# Patient Record
Sex: FEMALE | Race: BLACK OR AFRICAN AMERICAN | Employment: UNEMPLOYED | URBAN - METROPOLITAN AREA
[De-identification: names, ages, dates, MRNs, and addresses within clinical notes are randomized per-mention and may not be internally consistent; named-entity substitution may affect disease eponyms.]

---

## 2018-07-03 ENCOUNTER — OFFICE VISIT (OUTPATIENT)
Dept: ORTHOPEDIC SURGERY | Facility: CLINIC | Age: 53
End: 2018-07-03

## 2018-07-03 VITALS
DIASTOLIC BLOOD PRESSURE: 107 MMHG | OXYGEN SATURATION: 99 % | HEART RATE: 82 BPM | BODY MASS INDEX: 26.89 KG/M2 | WEIGHT: 177.4 LBS | HEIGHT: 68 IN | RESPIRATION RATE: 16 BRPM | SYSTOLIC BLOOD PRESSURE: 176 MMHG | TEMPERATURE: 96.6 F

## 2018-07-03 DIAGNOSIS — S63.502A SPRAIN OF LEFT WRIST, INITIAL ENCOUNTER: Primary | ICD-10-CM

## 2018-07-03 DIAGNOSIS — M25.532 WRIST PAIN, LEFT: ICD-10-CM

## 2018-07-03 DIAGNOSIS — S63.642A SPRAIN OF METACARPOPHALANGEAL (MCP) JOINT OF LEFT THUMB, INITIAL ENCOUNTER: ICD-10-CM

## 2018-07-03 NOTE — PROGRESS NOTES
Patient: Gilford Cava                MRN: [id number]       SSN: [id number]  YOB: 1965        AGE: 48 y.o. SEX: female  Body mass index is 26.97 kg/(m^2). PCP: Jairo Soliman MD  07/03/18    Chief Complaint: Left wrist injury    HISTORY OF PRESENT ILLNESS:  Mariah Calvo is a 51-year-old, right-hand dominant female who comes in to the office today for a left wrist injury. While working at [employer] on June 30, 2018, she unfortunately had a fall where she fell onto her left arm. At the time, she felt most of the injury was to her left elbow in the olecranon area. She had some abrasions there that she cleaned up. She continued to do her activity on Saturday at the [employer] but noticed Saturday night that she was having increasing pain around her left wrist.  It got to the point where she was seen at an urgent care. X-rays were taken. She was placed in a splint and instructed for orthopedic follow up. Today, she complains of swelling, as well as pain globally about the wrist.  She complains of pain with pronosupination. She denies any numbness or tingling. She also reports some bruising about the wrist.         Past Medical History:   Diagnosis Date    Anasarca     With ascites. Hospitalized 11/10. Resolved with diuretics. 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 Usually controlled with Dilantin.  Small bowel obstruction ([address] 07/2010       No family history on file. Current Outpatient Prescriptions   Medication Sig Dispense Refill    lactulose (CHRONULAC) 10 gram/15 mL solution TAKE 30 ML BY MOUTH THREE (3) TIMES DAILY. 900 mL 4    rifaximin (XIFAXAN) 550 mg tablet Take 1 Tab by mouth two (2) times a day.  180 Tab 4    spironolactone (ALDACTONE) 100 mg tablet Take 1 Tab by mouth three (3) times daily. 90 Tab 11    MULTIVITAMIN/IRON/FOLIC ACID (CENTRUM ULTRA WOMEN'S PO) Take  by mouth.  METOPROLOL TARTRATE (LOPRESSOR PO) Take  by mouth.  FENTANYL TD by TransDERmal route.  CLONIDINE TD by TransDERmal route.  OXYCODONE HCL/ACETAMINOPHEN (PERCOCET PO) Take  by mouth.  CYANOCOBALAMIN, VITAMIN B-12, (VITAMIN B-12 PO) Take  by mouth.  CALCIUM CARBONATE/VITAMIN D3 (CALCIUM + D PO) Take  by mouth.  PHENYTOIN SODIUM EXTENDED (DILANTIN PO) Take  by mouth.  furosemide (LASIX) 40 mg tablet Take 1 Tab by mouth three (3) times daily. 90 Tab 11       Allergies   Allergen Reactions    Latex Hives    Codeine Hives    Keflex [Cephalexin] Hives       Past Surgical History:   Procedure Laterality Date    HX GASTRIC BYPASS  2002    HX HIP REPLACEMENT  1991    Right hip replacement       Social History     Social History    Marital status: UNKNOWN     Spouse name: N/A    Number of children: N/A    Years of education: N/A     Occupational History    Not on file. Social History Main Topics    Smoking status: Former Smoker    Smokeless tobacco: Never Used    Alcohol use No    Drug use: Not on file    Sexual activity: Not on file     Other Topics Concern    Not on file     Social History Narrative       REVIEW OF SYSTEMS:      CON: negative for recent weight loss/gain, fever, or chills  EYE: negative for double or blurry vision  ENT: negative for hoarseness  RS:   negative for cough, URI, SOB  CV:  negative for chest pain, palpitations  GI:    negative for blood in stool, nausea/vomiting  :  negative for blood in urine  MS: As per HPI  Other systems reviewed and noted below. PHYSICAL EXAMINATION:  Visit Vitals    BP (!) 176/107    Pulse 82    Temp 96.6 °F (35.9 °C) (Oral)    Resp 16    Ht 5' 8\" (1.727 m)    Wt 177 lb 6.4 oz (80.5 kg)    SpO2 99%    BMI 26.97 kg/m2     Body mass index is 26.97 kg/(m^2).     GENERAL: Alert and oriented x3, in no acute distress, well-developed, well-nourished. HEENT: Normocephalic, atraumatic. RESP: Non labored breathing with equal chest rise on inspiration. CV: Well perfused extremities. No cyanosis or clubbing noted. ABDOMEN: Soft, non-tender, non-distended. PHYSICAL EXAMINATION:  Physical exam of the left wrist and thumb reveals some mild swelling. This is globally about the wrist and hand. She is tender to palpation along the radial side of the distal radius. She is nontender over the ulnar shaft, as well as the ulnar head and styloid. There is mild tenderness to palpation along the extensor pollicis longus tendon from the forearm into the wrist and thumb. There is mild pain with thumb range of motion. She has decreased wrist range of motion due to pain. Her sensation is intact. She has pain with pronosupination. IMAGING:  X-rays from June 30, 2018, taken at [facility], as well as x-rays from today, were reviewed in the office. They do not show any definitive fractures. There is a small, cortical irregularity on the radial side of the distal radius but no extension of this, which may be old. ASSESSMENT/PLAN:  Garima Galloway is a 61-year-old female with a left wrist and thumb sprain. I do not see any definitive evidence of fracture on x-rays today or at the time of her injury. I have elected to treat this like a wrist sprain, as well as a thumb sprain and place her into a thumb spica splint. I would like to see her back in one week. She is currently taking Percocet for pain management, so I recommended an anti-inflammatory in addition to that but no further pain medicine. All her questions were answered.          Electronically signed by: Teddy [place] MD

## 2018-07-13 ENCOUNTER — OFFICE VISIT (OUTPATIENT)
Dept: ORTHOPEDIC SURGERY | Facility: CLINIC | Age: 53
End: 2018-07-13

## 2018-07-13 VITALS
TEMPERATURE: 98.2 F | SYSTOLIC BLOOD PRESSURE: 145 MMHG | DIASTOLIC BLOOD PRESSURE: 82 MMHG | WEIGHT: 177 LBS | HEART RATE: 70 BPM | BODY MASS INDEX: 26.83 KG/M2 | OXYGEN SATURATION: 99 % | HEIGHT: 68 IN

## 2018-07-13 DIAGNOSIS — M25.532 WRIST PAIN, LEFT: Primary | ICD-10-CM

## 2018-07-13 NOTE — PROGRESS NOTES
Patient: Kem Amato                MRN: [id number]       SSN: [id number] YOB: 1965        AGE: 48 y.o. SEX: female Body mass index is 26.91 kg/(m^2). PCP: Liset Berman MD 
07/13/18 HISTORY OF PRESENT ILLNESS:  Benjamin Ralph returns the office today for follow-up of her left wrist and thumb. Overall she is doing better. Her pain is much improved. She has been wearing her  [employer] splint. Overall she is happy with the progress she has made in the last week. Past Medical History:  
Diagnosis Date  Anasarca With ascites. Hospitalized 11/10. Resolved with diuretics.  Cirrhosis of liver ([address] Secondary to NAFLD  Headache, migraine  Hepatic encephalopathy ([address] Controlled with lactulose and Xifaxan  Hypertension  Seizures ([address] Secondary to migraine headaches. Usually controlled with Dilantin.  Small bowel obstruction ([address] 07/2010 Family History Problem Relation Age of Onset  No Known Problems Mother  Heart Disease Father  Heart Attack Father Current Outpatient Prescriptions Medication Sig Dispense Refill  lactulose (CHRONULAC) 10 gram/15 mL solution TAKE 30 ML BY MOUTH THREE (3) TIMES DAILY. 900 mL 4  
 rifaximin (XIFAXAN) 550 mg tablet Take 1 Tab by mouth two (2) times a day. 180 Tab 4  
 spironolactone (ALDACTONE) 100 mg tablet Take 1 Tab by mouth three (3) times daily. 90 Tab 11  
 furosemide (LASIX) 40 mg tablet Take 1 Tab by mouth three (3) times daily. 90 Tab 11  
 MULTIVITAMIN/IRON/FOLIC ACID (CENTRUM ULTRA WOMEN'S PO) Take  by mouth.  METOPROLOL TARTRATE (LOPRESSOR PO) Take  by mouth.  FENTANYL TD by TransDERmal route.  CLONIDINE TD by TransDERmal route.  OXYCODONE HCL/ACETAMINOPHEN (PERCOCET PO) Take  by mouth.  CYANOCOBALAMIN, VITAMIN B-12, (VITAMIN B-12 PO) Take  by mouth.  CALCIUM CARBONATE/VITAMIN D3 (CALCIUM + D PO) Take  by mouth.     
 PHENYTOIN SODIUM EXTENDED (DILANTIN PO) Take  by mouth. Allergies Allergen Reactions  Latex Hives  Codeine Hives  Keflex [Cephalexin] Hives Past Surgical History:  
Procedure Laterality Date  HX GASTRIC BYPASS  2002 [address] Right hip replacement  HX KNEE REPLACEMENT Social History Social History  Marital status:  Spouse name: N/A  
 Number of children: N/A  
 Years of education: N/A Occupational History  Not on file. Social History Main Topics  Smoking status: Former Smoker  Smokeless tobacco: Never Used  Alcohol use No  
 Drug use: Not on file  Sexual activity: Not on file Other Topics Concern  Not on file Social History Narrative REVIEW OF SYSTEMS:   
 
No changes from previous review of systems unless noted. PHYSICAL EXAMINATION: 
Visit Vitals  /82 (BP 1 Location: Left arm, BP Patient Position: Sitting)  Pulse 70  Temp 98.2 °F (36.8 °C) (Oral)  Ht 5' 8\" (1.727 m)  Wt 177 lb (80.3 kg)  SpO2 99%  BMI 26.91 kg/m2 Body mass index is 26.91 kg/(m^2). GENERAL: Alert and oriented x3, in no acute distress. HEENT: Normocephalic, atraumatic. RESP: Non labored breathing. SKIN: No rashes or lesions noted. PHYSICAL EXAMINATION:  Physical exam of the left wrist and thumb after the arm was taken out of the splint reveals decreased tenderness to palpation as well as decreased swelling globally throughout the hand and wrist.  She has improved range of motion at the elbow, wrist, thumb, and fingers. She also has a mild tenderness to palpation over the dorsal side of her wrist as well is over the basement thumb. Overall she is improved, however, from her last visit. Her sensation is intact to light touch globally about the hand, wrist, forearm, and elbow. ASSESSMENT AND PLAN:  Nabil Nielson is a 62-year-old female with a left wrist sprain as well as left thumb sprain.    I have recommended continuing with conservative treatment wearing a brace as needed   She can start to wean from the brace as tolerated. I would like to see her back in 3 weeks with the plan at that time to wean her from the brace full time if she has not done it already.  
 
 
 
Electronically signed by: Christina Thomas MD

## 2020-09-24 ENCOUNTER — VIRTUAL VISIT (OUTPATIENT)
Dept: FAMILY MEDICINE CLINIC | Age: 55
End: 2020-09-24
Payer: COMMERCIAL

## 2020-09-24 DIAGNOSIS — R11.0 NAUSEA: ICD-10-CM

## 2020-09-24 DIAGNOSIS — R56.9 SEIZURE (HCC): Primary | ICD-10-CM

## 2020-09-24 DIAGNOSIS — I10 ESSENTIAL HYPERTENSION: ICD-10-CM

## 2020-09-24 PROCEDURE — 1220F PT SCREENED FOR DEPRESSION: CPT | Performed by: FAMILY MEDICINE

## 2020-09-24 PROCEDURE — 99202 OFFICE O/P NEW SF 15 MIN: CPT | Performed by: FAMILY MEDICINE

## 2020-09-24 RX ORDER — CLONIDINE HYDROCHLORIDE 0.2 MG/1
0.2 TABLET ORAL 2 TIMES DAILY
Qty: 60 TAB | Refills: 2 | Status: SHIPPED | OUTPATIENT
Start: 2020-09-24 | End: 2021-05-06 | Stop reason: SDUPTHER

## 2020-09-24 RX ORDER — BACLOFEN 10 MG/1
TABLET ORAL
COMMUNITY
Start: 2020-09-05 | End: 2021-05-18 | Stop reason: SDUPTHER

## 2020-09-24 RX ORDER — ONDANSETRON 8 MG/1
8 TABLET, ORALLY DISINTEGRATING ORAL
Qty: 12 TAB | Refills: 2 | Status: SHIPPED | OUTPATIENT
Start: 2020-09-24 | End: 2021-05-06 | Stop reason: SDUPTHER

## 2020-09-24 RX ORDER — LEVETIRACETAM 500 MG/1
500 TABLET ORAL 2 TIMES DAILY
COMMUNITY

## 2020-09-24 RX ORDER — ONDANSETRON 4 MG/1
TABLET, ORALLY DISINTEGRATING ORAL
COMMUNITY
Start: 2018-02-19 | End: 2020-09-24 | Stop reason: ALTCHOICE

## 2020-09-24 RX ORDER — ZOLPIDEM TARTRATE 10 MG/1
10 TABLET ORAL
COMMUNITY
End: 2020-09-24 | Stop reason: ALTCHOICE

## 2020-09-24 RX ORDER — ONDANSETRON 4 MG/1
4 TABLET, FILM COATED ORAL
COMMUNITY
End: 2020-09-24 | Stop reason: ALTCHOICE

## 2020-09-24 RX ORDER — ZOLPIDEM TARTRATE 10 MG/1
10 TABLET ORAL
Status: CANCELLED | OUTPATIENT
Start: 2020-09-24

## 2020-09-24 RX ORDER — OMEPRAZOLE 20 MG/1
CAPSULE, DELAYED RELEASE ORAL
COMMUNITY
Start: 2020-09-06 | End: 2021-05-06 | Stop reason: SDUPTHER

## 2020-09-24 RX ORDER — BUTALBITAL, ACETAMINOPHEN AND CAFFEINE 50; 325; 40 MG/1; MG/1; MG/1
TABLET ORAL
Qty: 24 TAB | Refills: 1 | Status: SHIPPED | OUTPATIENT
Start: 2020-09-24 | End: 2020-10-21 | Stop reason: SDUPTHER

## 2020-09-24 RX ORDER — BUTALBITAL, ACETAMINOPHEN AND CAFFEINE 50; 325; 40 MG/1; MG/1; MG/1
TABLET ORAL
COMMUNITY
Start: 2017-11-17 | End: 2020-09-24 | Stop reason: SDUPTHER

## 2020-09-24 RX ORDER — CLONIDINE HYDROCHLORIDE 0.2 MG/1
0.2 TABLET ORAL 2 TIMES DAILY
COMMUNITY
End: 2020-09-24 | Stop reason: SDUPTHER

## 2020-09-24 RX ORDER — POTASSIUM CHLORIDE 20 MEQ/1
TABLET, EXTENDED RELEASE ORAL
COMMUNITY
Start: 2017-09-06 | End: 2021-05-06 | Stop reason: SDUPTHER

## 2020-09-24 RX ORDER — GABAPENTIN 300 MG/1
300 CAPSULE ORAL 3 TIMES DAILY
COMMUNITY

## 2020-09-24 NOTE — PROGRESS NOTES
Joe Rooney is a 50 y.o. female who was seen by synchronous (real-time) audio-video technology on 9/24/2020 for Hypertension; Headache; Seizure; and Establish Care        Assessment & Plan:   Diagnoses and all orders for this visit:    1. 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 Controlled with Dilantin  -     butalbital-acetaminophen-caffeine (FIORICET, ESGIC) -40 mg per tablet; Take 1 Tab by Mouth Every 4 Hours As Needed for Other (HEADACHE). Not to exceed 6 TABLETS per day. No driving on this medication;  -     MS PATIENT SCREENED FOR DEPRESSION  -     REFERRAL TO NEUROLOGY  -     PHENYTOIN; Future    2. Nausea  -     ondansetron (ZOFRAN ODT) 8 mg disintegrating tablet; Take 1 Tab by mouth every eight (8) hours as needed for Nausea or Vomiting. 3. Essential hypertension  -     cloNIDine HCL (CATAPRES) 0.2 mg tablet; Take 1 Tab by mouth two (2) times a day. -     METABOLIC PANEL, COMPREHENSIVE; Future  -     CBC WITH AUTOMATED DIFF; Future  -     LIPID PANEL; Future  -     TSH AND FREE T4; Future          712  Subjective:       Prior to Admission medications    Medication Sig Start Date End Date Taking? Authorizing Provider   levETIRAcetam (Keppra) 500 mg tablet Take 500 mg by mouth two (2) times a day. Yes Provider, Historical   gabapentin (NEURONTIN) 300 mg capsule Take 300 mg by mouth three (3) times daily. Yes Provider, Historical   baclofen (LIORESAL) 10 mg tablet  9/5/20  Yes Provider, Historical   potassium chloride (Klor-Con M20) 20 mEq tablet TAKE 1 TAB BY MOUTH ONCE A DAY. 9/6/17  Yes Provider, Historical   butalbital-acetaminophen-caffeine (FIORICET, ESGIC) -40 mg per tablet Take 1 Tab by Mouth Every 4 Hours As Needed for Other (HEADACHE). Not to exceed 6 TABLETS per day. No driving on this medication; 9/24/20  Yes Julio Guerrero MD   cloNIDine HCL (CATAPRES) 0.2 mg tablet Take 1 Tab by mouth two (2) times a day.  9/24/20  Yes Julio Guerrero MD   ondansetron (ZOFRAN ODT) 8 mg disintegrating tablet Take 1 Tab by mouth every eight (8) hours as needed for Nausea or Vomiting. 9/24/20  Yes Jimmie Rivera MD   lactulose (CHRONULAC) 10 gram/15 mL solution TAKE 30 ML BY MOUTH THREE (3) TIMES DAILY. 7/30/16  Yes Leticia Palafox MD   rifaximin (XIFAXAN) 550 mg tablet Take 1 Tab by mouth two (2) times a day. 5/24/12  Yes Mansi Gary NP   spironolactone (ALDACTONE) 100 mg tablet Take 1 Tab by mouth three (3) times daily. 12/15/11  Yes Mansi Gary NP   MULTIVITAMIN/IRON/FOLIC ACID (CENTRUM ULTRA WOMEN'S PO) Take  by mouth. Yes Provider, Historical   METOPROLOL TARTRATE (LOPRESSOR PO) Take 25 mg by mouth every eight (8) hours. Yes Provider, Historical   FENTANYL TD 12 mcg by TransDERmal route. Yes Provider, Historical   CYANOCOBALAMIN, VITAMIN B-12, (VITAMIN B-12 PO) Take  by mouth. Yes Provider, Historical   CALCIUM CARBONATE/VITAMIN D3 (CALCIUM + D PO) Take  by mouth. Yes Provider, Historical   PHENYTOIN SODIUM EXTENDED (DILANTIN PO) Take 400 mg by mouth. Yes Provider, Historical   omeprazole (PRILOSEC) 20 mg capsule  9/6/20   Provider, Historical   cloNIDine HCL (CATAPRES) 0.2 mg tablet Take 0.2 mg by mouth two (2) times a day. 9/24/20  Provider, Historical   BACLOFEN PO Take 100 mg by mouth.  9/24/20  Provider, Historical   zolpidem (Ambien) 10 mg tablet Take 10 mg by mouth nightly as needed for Sleep. 9/24/20  Provider, Historical   ondansetron hcl (Zofran) 4 mg tablet Take 4 mg by mouth every eight (8) hours as needed for Nausea or Vomiting.  9/24/20  Provider, Historical   butalbital-acetaminophen-caffeine (FIORICET, ESGIC) -40 mg per tablet Take 1 Tab by Mouth Every 4 Hours As Needed for Other (HEADACHE). Not to exceed 6 TABLETS per day.   No driving on this medication; 11/17/17 9/24/20  Provider, Historical   ondansetron (ZOFRAN ODT) 4 mg disintegrating tablet DISSOLVE 1 TAB ON TONGUE EVERY 8 HOURS FOR NAUSEA AND VOMITING 2/19/18 9/24/20  Provider, Historical furosemide (LASIX) 40 mg tablet Take 1 Tab by mouth three (3) times daily. 12/15/11   Real Fleet, NP   CLONIDINE TD by TransDERmal route. 9/24/20  Provider, Historical   OXYCODONE HCL/ACETAMINOPHEN (PERCOCET PO) Take 5 mg by mouth.  9/24/20  Provider, Historical     Patient Active Problem List   Diagnosis Code    MINOR (nonalcoholic steatohepatitis) K75.81    Cir[facility] K74.60    S/P gastric bypass 2002 Z98.84    Hypertension I10    Right Hip replacement 1991     Small bowel obstruction 7/2010 K56.609    Hepatic encephalopathy (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 Controlled with Dilantin R56.9     Past Medical History:   Diagnosis Date    Anasarca     With ascites. Hospitalized 11/10. Resolved with diuretics.  Cirrhosis of liver ([facility]     Secondary to NAFLD    Headache, migraine     Hepatic encephalopathy ([address]     Controlled with lactulose and Xifaxan    Hypertension     Seizures ([address]     Secondary to migraine headaches. Usually controlled with Dilantin.  Small bowel obstruction ([address] 07/2010       Review of Systems   Constitutional: Negative for chills and fever. Respiratory: Negative for cough. Cardiovascular: Negative for chest pain. Neurological: Negative. Psychiatric/Behavioral: Negative. Objective:   No flowsheet data found. General: alert, cooperative, no distress   Mental  status: normal mood, behavior, speech, dress, motor activity, and thought processes, able to follow commands   HENT: NCAT   Neck: no visualized mass   Resp: no respiratory distress   Neuro: no gross deficits   Skin: no discoloration or lesions of concern on visible areas   Psychiatric: normal affect, consistent with stated mood, no evidence of hallucinations     Additional exam findings: We discussed the expected course, resolution and complications of the diagnosis(es) in detail.   Medication risks, benefits, costs, interactions, and alternatives were discussed as indicated. I advised her to contact the office if her condition worsens, changes or fails to improve as anticipated. She expressed understanding with the diagnosis(es) and plan. Juan Luis Batista, who was evaluated through a patient-initiated, synchronous (real-time) audio-video encounter, and/or her healthcare decision maker, is aware that it is a billable service, with coverage as determined by her insurance carrier. She provided verbal consent to proceed: Yes, and patient identification was verified. It was conducted pursuant to the emergency declaration under the [address] [place] [address], [address] [place] [address] authority and the [employer] Act. A caregiver was present when appropriate. Ability to conduct physical exam was limited. I was at home. The patient was at home.       Tommie Engle MD

## 2020-09-24 NOTE — PROGRESS NOTES
Pt VV NP for headaches and med refills. She needs referral to neuro. Would like to see if she could get Zofran 8 mg disintegrating  tab instead of the 4 mg. Pt has hx seizures, HNT, migraines. Was given Topamax high dose, but it didn't do anything for her headaches. Also tried nortriptyline, but that made her too groggy. She does see pain management. Refills have been pended. I did pend the Zofran 8 mg, please review and sign if appropriate.

## 2020-10-21 ENCOUNTER — TELEPHONE (OUTPATIENT)
Dept: FAMILY MEDICINE CLINIC | Age: 55
End: 2020-10-21

## 2020-10-21 DIAGNOSIS — R56.9 SEIZURE (HCC): ICD-10-CM

## 2020-10-21 NOTE — TELEPHONE ENCOUNTER
Patient called requesting a refill on:   Requested Prescriptions     Pending Prescriptions Disp Refills    butalbital-acetaminophen-caffeine (FIORICET, ESGIC) -40 mg per tablet 24 Tab 1     Sig: Take 1 Tab by Mouth Every 4 Hours As Needed for Other (HEADACHE). Not to exceed 6 TABLETS per day. No driving on this medication;      Last seen 9/24/20

## 2020-10-22 ENCOUNTER — TELEPHONE (OUTPATIENT)
Dept: FAMILY MEDICINE CLINIC | Age: 55
End: 2020-10-22

## 2020-10-22 RX ORDER — BUTALBITAL, ACETAMINOPHEN AND CAFFEINE 50; 325; 40 MG/1; MG/1; MG/1
TABLET ORAL
Qty: 24 TAB | Refills: 1 | Status: SHIPPED | OUTPATIENT
Start: 2020-10-22 | End: 2020-11-25 | Stop reason: SDUPTHER

## 2020-10-22 NOTE — TELEPHONE ENCOUNTER
butalbital-acetaminophen-caffeine (FIORICET, ESGIC) -40 mg per tablet [[id number]]     Order Details   Dose, Route, Frequency: As Directed    Dispense Quantity: 24 Tab  Refills: 1  Fills remaining: --             Sig:      Take 1 Tab by Mouth Every 4 Hours As Needed for Other (HEADACHE). Not to exceed 6 TABLETS per day. No driving on this medication;                     Above med called in to pharmacy. Paper copy shredded.

## 2020-11-25 DIAGNOSIS — R56.9 SEIZURE (HCC): ICD-10-CM

## 2020-11-25 RX ORDER — BUTALBITAL, ACETAMINOPHEN AND CAFFEINE 50; 325; 40 MG/1; MG/1; MG/1
TABLET ORAL
Qty: 24 TAB | Refills: 1 | Status: SHIPPED | OUTPATIENT
Start: 2020-11-25 | End: 2020-12-17

## 2020-11-25 NOTE — TELEPHONE ENCOUNTER
Deborah Goodson LPN            Previous Messages     ----- Message -----   From: Tha Calloway   Sent: 11/20/2020  11:55 AM EST   To: Elia Burks [facility] Office [place]   Subject: Med Refill                                       The patient is requesting a medication refill for butalbital-acetaminophen-caffeine (FIORICET, ESGIC) -40 mg per tablet.

## 2020-12-16 DIAGNOSIS — R56.9 SEIZURE (HCC): ICD-10-CM

## 2020-12-17 RX ORDER — BUTALBITAL, ACETAMINOPHEN AND CAFFEINE 50; 325; 40 MG/1; MG/1; MG/1
TABLET ORAL
Qty: 24 TAB | Refills: 1 | Status: SHIPPED | OUTPATIENT
Start: 2020-12-17 | End: 2021-01-11

## 2021-01-08 DIAGNOSIS — R56.9 SEIZURE (HCC): ICD-10-CM

## 2021-01-11 RX ORDER — ZOLPIDEM TARTRATE 10 MG/1
TABLET ORAL
Qty: 30 TAB | Refills: 1 | Status: SHIPPED | OUTPATIENT
Start: 2021-01-11 | End: 2021-03-02 | Stop reason: SDUPTHER

## 2021-01-11 RX ORDER — BUTALBITAL, ACETAMINOPHEN AND CAFFEINE 50; 325; 40 MG/1; MG/1; MG/1
TABLET ORAL
Qty: 24 TAB | Refills: 1 | Status: SHIPPED | OUTPATIENT
Start: 2021-01-11 | End: 2021-02-03

## 2021-02-03 DIAGNOSIS — R56.9 SEIZURE (HCC): ICD-10-CM

## 2021-02-03 RX ORDER — BUTALBITAL, ACETAMINOPHEN AND CAFFEINE 50; 325; 40 MG/1; MG/1; MG/1
TABLET ORAL
Qty: 24 TAB | Refills: 1 | Status: SHIPPED | OUTPATIENT
Start: 2021-02-03 | End: 2021-03-01

## 2021-03-01 DIAGNOSIS — R56.9 SEIZURE (HCC): ICD-10-CM

## 2021-03-01 RX ORDER — BUTALBITAL, ACETAMINOPHEN AND CAFFEINE 50; 325; 40 MG/1; MG/1; MG/1
TABLET ORAL
Qty: 24 TAB | Refills: 1 | Status: SHIPPED | OUTPATIENT
Start: 2021-03-01 | End: 2021-03-02 | Stop reason: SDUPTHER

## 2021-03-02 DIAGNOSIS — R56.9 SEIZURE (HCC): ICD-10-CM

## 2021-03-02 RX ORDER — BUTALBITAL, ACETAMINOPHEN AND CAFFEINE 50; 325; 40 MG/1; MG/1; MG/1
1 TABLET ORAL
Qty: 24 TAB | Refills: 1 | Status: SHIPPED | OUTPATIENT
Start: 2021-03-02 | End: 2021-03-26

## 2021-03-02 RX ORDER — ZOLPIDEM TARTRATE 10 MG/1
TABLET ORAL
Qty: 30 TAB | Refills: 1 | Status: SHIPPED | OUTPATIENT
Start: 2021-03-02 | End: 2021-05-06 | Stop reason: SDUPTHER

## 2021-03-02 NOTE — TELEPHONE ENCOUNTER
This patient contacted office for the following prescriptions to be filled:    Medication requested :   Requested Prescriptions     Pending Prescriptions Disp Refills    butalbital-acetaminophen-caffeine (FIORICET, ESGIC) -40 mg per tablet 24 Tab 1    zolpidem (AMBIEN) 10 mg tablet 30 Tab 1     Sig: TAKE 1 TAB BY MOUTH NIGHTLY AS NEEDED (SLEEP). PCP: Dr. Lars Martinez or Print: Pharmacy  Mail order or Local pharmacy: Local     Scheduled appointment if not seen by current providers in office: Last appt 09/24/20  Please advise on scheduling.

## 2021-03-03 ENCOUNTER — TELEPHONE (OUTPATIENT)
Dept: FAMILY MEDICINE CLINIC | Age: 56
End: 2021-03-03

## 2021-03-03 NOTE — TELEPHONE ENCOUNTER
Patient called the office stating her Fioricet was not received at her pharmacy. Told this may have printed out and I will send a message to Dr. Kelly Mccracken nurse to call her to discuss. Patient says she drives from [facility] to  her medications and would like to be able to pick them both up today at the same time, please call patient.

## 2021-03-03 NOTE — TELEPHONE ENCOUNTER
butalbital-acetaminophen-caffeine (FIORICET, ESGIC) -40 mg per tablet   Take 1 Tab by mouth every four (4) hours as needed for Headache.      Above medication called in on the pharmacy prescription line at 10:06 am.

## 2021-03-25 DIAGNOSIS — R56.9 SEIZURE (HCC): ICD-10-CM

## 2021-03-26 RX ORDER — BUTALBITAL, ACETAMINOPHEN AND CAFFEINE 50; 325; 40 MG/1; MG/1; MG/1
TABLET ORAL
Qty: 24 TAB | Refills: 1 | Status: SHIPPED | OUTPATIENT
Start: 2021-03-26 | End: 2021-04-08

## 2021-04-08 DIAGNOSIS — R56.9 SEIZURE (HCC): ICD-10-CM

## 2021-04-08 RX ORDER — BUTALBITAL, ACETAMINOPHEN AND CAFFEINE 50; 325; 40 MG/1; MG/1; MG/1
TABLET ORAL
Qty: 24 TAB | Refills: 1 | Status: SHIPPED | OUTPATIENT
Start: 2021-04-08 | End: 2021-05-06 | Stop reason: SDUPTHER

## 2021-05-04 DIAGNOSIS — R56.9 SEIZURE (HCC): ICD-10-CM

## 2021-05-04 RX ORDER — ZOLPIDEM TARTRATE 10 MG/1
TABLET ORAL
Qty: 30 TAB | Refills: 1 | OUTPATIENT
Start: 2021-05-04

## 2021-05-04 RX ORDER — BUTALBITAL, ACETAMINOPHEN AND CAFFEINE 50; 325; 40 MG/1; MG/1; MG/1
TABLET ORAL
Qty: 24 TAB | Refills: 1 | OUTPATIENT
Start: 2021-05-04

## 2021-05-06 ENCOUNTER — PATIENT MESSAGE (OUTPATIENT)
Dept: FAMILY MEDICINE CLINIC | Age: 56
End: 2021-05-06

## 2021-05-06 ENCOUNTER — OFFICE VISIT (OUTPATIENT)
Dept: FAMILY MEDICINE CLINIC | Age: 56
End: 2021-05-06
Payer: COMMERCIAL

## 2021-05-06 ENCOUNTER — HOSPITAL ENCOUNTER (OUTPATIENT)
Dept: LAB | Age: 56
Discharge: HOME | End: 2021-05-06
Payer: COMMERCIAL

## 2021-05-06 VITALS
SYSTOLIC BLOOD PRESSURE: 124 MMHG | WEIGHT: 194.6 LBS | BODY MASS INDEX: 29.59 KG/M2 | DIASTOLIC BLOOD PRESSURE: 70 MMHG | TEMPERATURE: 98.1 F | RESPIRATION RATE: 14 BRPM | OXYGEN SATURATION: 96 % | HEART RATE: 88 BPM

## 2021-05-06 DIAGNOSIS — R56.9 SEIZURE (HCC): ICD-10-CM

## 2021-05-06 DIAGNOSIS — I10 ESSENTIAL HYPERTENSION: ICD-10-CM

## 2021-05-06 DIAGNOSIS — R11.0 NAUSEA: ICD-10-CM

## 2021-05-06 DIAGNOSIS — Z12.31 ENCOUNTER FOR SCREENING MAMMOGRAM FOR MALIGNANT NEOPLASM OF BREAST: ICD-10-CM

## 2021-05-06 DIAGNOSIS — I10 ESSENTIAL HYPERTENSION: Primary | ICD-10-CM

## 2021-05-06 DIAGNOSIS — K74.60 CIRRHOSIS (HCC): ICD-10-CM

## 2021-05-06 DIAGNOSIS — K59.01 SLOW TRANSIT CONSTIPATION: ICD-10-CM

## 2021-05-06 LAB
ALBUMIN SERPL-MCNC: 2.7 G/DL (ref 3.4–5)
ALBUMIN/GLOB SERPL: 0.6 {RATIO} (ref 0.8–1.7)
ALP SERPL-CCNC: 193 U/L (ref 45–117)
ALT SERPL-CCNC: 24 U/L (ref 13–56)
ANION GAP SERPL CALC-SCNC: 5 MMOL/L (ref 3–18)
AST SERPL-CCNC: 14 U/L (ref 10–38)
BASOPHILS # BLD: 0 K/UL (ref 0–0.1)
BASOPHILS NFR BLD: 0 % (ref 0–2)
BILIRUB SERPL-MCNC: 0.1 MG/DL (ref 0.2–1)
BUN SERPL-MCNC: 9 MG/DL (ref 7–18)
BUN/CREAT SERPL: 14 (ref 12–20)
CALCIUM SERPL-MCNC: 8.3 MG/DL (ref 8.5–10.1)
CHLORIDE SERPL-SCNC: 108 MMOL/L (ref 100–111)
CHOLEST SERPL-MCNC: 149 MG/DL
CO2 SERPL-SCNC: 27 MMOL/L (ref 21–32)
CREAT SERPL-MCNC: 0.65 MG/DL (ref 0.6–1.3)
DIFFERENTIAL METHOD BLD: ABNORMAL
EOSINOPHIL # BLD: 0.2 K/UL (ref 0–0.4)
EOSINOPHIL NFR BLD: 2 % (ref 0–5)
ERYTHROCYTE [DISTWIDTH] IN BLOOD BY AUTOMATED COUNT: 13.2 % (ref 11.6–14.5)
GLOBULIN SER CALC-MCNC: 4.7 G/DL (ref 2–4)
GLUCOSE SERPL-MCNC: 55 MG/DL (ref 74–99)
HCT VFR BLD AUTO: 35.3 % (ref 35–45)
HDLC SERPL-MCNC: 40 MG/DL (ref 40–60)
HDLC SERPL: 3.7 {RATIO} (ref 0–5)
HGB BLD-MCNC: 11.4 G/DL (ref 12–16)
LDLC SERPL CALC-MCNC: 89.6 MG/DL (ref 0–100)
LIPID PROFILE,FLP: NORMAL
LYMPHOCYTES # BLD: 1.3 K/UL (ref 0.9–3.6)
LYMPHOCYTES NFR BLD: 16 % (ref 21–52)
MCH RBC QN AUTO: 31.1 PG (ref 24–34)
MCHC RBC AUTO-ENTMCNC: 32.3 G/DL (ref 31–37)
MCV RBC AUTO: 96.4 FL (ref 74–97)
MONOCYTES # BLD: 0.7 K/UL (ref 0.05–1.2)
MONOCYTES NFR BLD: 8 % (ref 3–10)
NEUTS SEG # BLD: 5.9 K/UL (ref 1.8–8)
NEUTS SEG NFR BLD: 73 % (ref 40–73)
PLATELET # BLD AUTO: 701 K/UL (ref 135–420)
PMV BLD AUTO: 9.3 FL (ref 9.2–11.8)
POTASSIUM SERPL-SCNC: 3.9 MMOL/L (ref 3.5–5.5)
PROT SERPL-MCNC: 7.4 G/DL (ref 6.4–8.2)
RBC # BLD AUTO: 3.66 M/UL (ref 4.2–5.3)
SODIUM SERPL-SCNC: 140 MMOL/L (ref 136–145)
T4 FREE SERPL-MCNC: 1.2 NG/DL (ref 0.7–1.5)
TRIGL SERPL-MCNC: 97 MG/DL (ref ?–150)
TSH SERPL DL<=0.05 MIU/L-ACNC: 2.44 UIU/ML (ref 0.36–3.74)
VLDLC SERPL CALC-MCNC: 19.4 MG/DL
WBC # BLD AUTO: 8.2 K/UL (ref 4.6–13.2)

## 2021-05-06 PROCEDURE — 84439 ASSAY OF FREE THYROXINE: CPT

## 2021-05-06 PROCEDURE — 85025 COMPLETE CBC W/AUTO DIFF WBC: CPT

## 2021-05-06 PROCEDURE — 80061 LIPID PANEL: CPT

## 2021-05-06 PROCEDURE — 99214 OFFICE O/P EST MOD 30 MIN: CPT | Performed by: FAMILY MEDICINE

## 2021-05-06 PROCEDURE — 80053 COMPREHEN METABOLIC PANEL: CPT

## 2021-05-06 RX ORDER — LACTULOSE 10 G/15ML
SOLUTION ORAL; RECTAL
Qty: 900 ML | Refills: 1 | Status: SHIPPED | OUTPATIENT
Start: 2021-05-06

## 2021-05-06 RX ORDER — FUROSEMIDE 40 MG/1
40 TABLET ORAL 3 TIMES DAILY
Qty: 90 TAB | Refills: 1 | Status: SHIPPED | OUTPATIENT
Start: 2021-05-06 | End: 2021-05-28

## 2021-05-06 RX ORDER — POTASSIUM CHLORIDE 20 MEQ/1
TABLET, EXTENDED RELEASE ORAL
Qty: 90 TAB | Refills: 1 | OUTPATIENT
Start: 2021-05-06 | End: 2021-10-04

## 2021-05-06 RX ORDER — ZOLPIDEM TARTRATE 10 MG/1
TABLET ORAL
Qty: 30 TAB | Refills: 1 | Status: SHIPPED | OUTPATIENT
Start: 2021-05-06 | End: 2021-06-03

## 2021-05-06 RX ORDER — BUTALBITAL, ACETAMINOPHEN AND CAFFEINE 50; 325; 40 MG/1; MG/1; MG/1
TABLET ORAL
Qty: 24 TAB | Refills: 1 | Status: SHIPPED | OUTPATIENT
Start: 2021-05-06 | End: 2021-06-03

## 2021-05-06 RX ORDER — OMEPRAZOLE 20 MG/1
20 CAPSULE, DELAYED RELEASE ORAL DAILY
Qty: 90 CAP | Refills: 1 | Status: SHIPPED | OUTPATIENT
Start: 2021-05-06 | End: 2021-11-17

## 2021-05-06 RX ORDER — SPIRONOLACTONE 100 MG/1
100 TABLET, FILM COATED ORAL 3 TIMES DAILY
Qty: 270 TAB | Refills: 1 | Status: SHIPPED | OUTPATIENT
Start: 2021-05-06 | End: 2021-11-18 | Stop reason: SDUPTHER

## 2021-05-06 RX ORDER — ONDANSETRON 8 MG/1
8 TABLET, ORALLY DISINTEGRATING ORAL
Qty: 12 TAB | Refills: 2 | Status: SHIPPED | OUTPATIENT
Start: 2021-05-06 | End: 2021-11-18 | Stop reason: SDUPTHER

## 2021-05-06 RX ORDER — CLONIDINE HYDROCHLORIDE 0.2 MG/1
0.2 TABLET ORAL 2 TIMES DAILY
Qty: 180 TAB | Refills: 1 | Status: SHIPPED | OUTPATIENT
Start: 2021-05-06 | End: 2021-11-18 | Stop reason: SDUPTHER

## 2021-05-06 NOTE — PATIENT INSTRUCTIONS
Constipation: Care Instructions Your Care Instructions Constipation means that you have a hard time passing stools (bowel movements). People pass stools from 3 times a day to once every 3 days. What is normal for you may be different. Constipation may occur with pain in the rectum and cramping. The pain may get worse when you try to pass stools. Sometimes there are small amounts of bright red blood on toilet paper or the surface of stools. This is because of enlarged veins near the rectum (hemorrhoids). A few changes in your diet and lifestyle may help you avoid ongoing constipation. Your doctor may also prescribe medicine to help loosen your stool. Some medicines can cause constipation. These include pain medicines and antidepressants. Tell your doctor about all the medicines you take. Your doctor may want to make a medicine change to ease your symptoms. Follow-up care is a key part of your treatment and safety. Be sure to make and go to all appointments, and call your doctor if you are having problems. It's also a good idea to know your test results and keep a list of the medicines you take. How can you care for yourself at home? · Drink plenty of fluids. If you have kidney, heart, or liver disease and have to limit fluids, talk with your doctor before you increase the amount of fluids you drink. · Include high-fiber foods in your diet each day. These include fruits, vegetables, beans, and whole grains. · Get at least 30 minutes of exercise on most days of the week. Walking is a good choice. You also may want to do other activities, such as running, swimming, cycling, or playing tennis or team sports. · Take a fiber supplement, such as Citrucel or Metamucil, every day. Read and follow all instructions on the label. · Schedule time each day for a bowel movement. A daily routine may help. Take your time having your bowel movement.  
· Support your feet with a small step stool when you sit on the toilet. This helps flex your hips and places your pelvis in a squatting position. · Your doctor may recommend an over-the-counter laxative to relieve your constipation. Examples are Milk of Magnesia and MiraLax. Read and follow all instructions on the label. Do not use laxatives on a long-term basis. When should you call for help? Call your doctor now or seek immediate medical care if: 
  · You have new or worse belly pain.  
  · You have new or worse nausea or vomiting.  
  · You have blood in your stools. Watch closely for changes in your health, and be sure to contact your doctor if: 
  · Your constipation is getting worse.  
  · You do not get better as expected. Where can you learn more? Go to http://www.gray.com/ Enter 21 [phone] in the search box to learn more about \"Constipation: Care Instructions. \" Current as of: February 26, 2020               Content Version: 12.8 © 1748-5030 [employer]. Care instructions adapted under license by [employer] (which disclaims liability or warranty for this information). If you have questions about a medical condition or this instruction, always ask your healthcare professional. [address] any warranty or liability for your use of this information.

## 2021-05-06 NOTE — PROGRESS NOTES
Jadiel Abrams is a 49 y.o. female  presents for follow up on chronic illness. No new sxs. She does have some nausea and GI sxs. No vomiting     Allergies   Allergen Reactions    Latex Hives    Codeine Hives    Keflex [Cephalexin] Hives     Outpatient Medications Marked as Taking for the 5/6/21 encounter (Office Visit) with Severo Long, MD   Medication Sig Dispense Refill    butalbital-acetaminophen-caffeine (FIORICET, ESGIC) -40 mg per tablet TAKE 1 TABLET BY MOUTH EVERY 4 HOURS AS NEEDED FOR HEADACHES 24 Tab 1    omeprazole (PRILOSEC) 20 mg capsule Take 1 Cap by mouth daily. 90 Cap 1    ondansetron (ZOFRAN ODT) 8 mg disintegrating tablet Take 1 Tab by mouth every eight (8) hours as needed for Nausea or Vomiting. 12 Tab 2    lactulose (CHRONULAC) 10 gram/15 mL solution TAKE 30 ML BY MOUTH THREE (3) TIMES DAILY. 900 mL 1    spironolactone (Aldactone) 100 mg tablet Take 1 Tab by mouth three (3) times daily. 270 Tab 1    furosemide (Lasix) 40 mg tablet Take 1 Tab by mouth three (3) times daily. 90 Tab 1    cloNIDine HCL (CATAPRES) 0.2 mg tablet Take 1 Tab by mouth two (2) times a day. 180 Tab 1    potassium chloride (Klor-Con M20) 20 mEq tablet TAKE 1 TAB BY MOUTH ONCE A DAY. 90 Tab 1    zolpidem (AMBIEN) 10 mg tablet TAKE 1 TAB BY MOUTH NIGHTLY AS NEEDED (SLEEP). 30 Tab 1    levETIRAcetam (Keppra) 500 mg tablet Take 500 mg by mouth two (2) times a day.  gabapentin (NEURONTIN) 300 mg capsule Take 300 mg by mouth three (3) times daily.  rifaximin (XIFAXAN) 550 mg tablet Take 1 Tab by mouth two (2) times a day. 180 Tab 4    MULTIVITAMIN/IRON/FOLIC ACID (CENTRUM ULTRA WOMEN'S PO) Take  by mouth.  METOPROLOL TARTRATE (LOPRESSOR PO) Take 25 mg by mouth every eight (8) hours.  FENTANYL TD 12 mcg by TransDERmal route.  CYANOCOBALAMIN, VITAMIN B-12, (VITAMIN B-12 PO) Take  by mouth.  CALCIUM CARBONATE/VITAMIN D3 (CALCIUM + D PO) Take  by mouth.       PHENYTOIN SODIUM EXTENDED (DILANTIN PO) Take 400 mg by mouth. 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 Hospitalized 11/10. Resolved with diuretics.  Cirrhosis of liver ([facility]     Secondary to NAFLD    Headache, migraine     [facility] ([facility]     Controlled with lactulose and Xifaxan    Hypertension     Seizures ([facility]     Secondary to migraine headaches. Usually controlled with Dilantin.  Small bowel obstruction ([facility] 07/2010     Social History     Socioeconomic History    Marital status:      Spouse name: Not on file    Number of children: Not on file    Years of education: Not on file    Highest education level: Not on file   Tobacco Use    Smoking status: Former Smoker    Smokeless tobacco: Never Used   Substance and Sexual Activity    Alcohol use: No     Family History   Problem Relation Age of Onset    No Known Problems Mother     Heart Disease Father     Heart Attack Father         Review of Systems   Constitutional: Negative for chills, fever, malaise/fatigue and weight loss. Eyes: Negative for blurred vision. Respiratory: Negative for cough, shortness of breath and wheezing. Cardiovascular: Negative for chest pain. Gastrointestinal: Positive for constipation, heartburn and nausea. Negative for blood in stool, diarrhea, melena and vomiting. Musculoskeletal: Negative for myalgias. Skin: Negative for rash. Neurological: Negative for weakness.        Vitals:    05/06/21 0810   BP: 124/70   Pulse: 88   Resp: 14   Temp: 98.1 °F (36.7 °C)   TempSrc: Oral   SpO2: 96%   Weight: 194 lb 9.6 oz (88.3 kg)   PainSc:   8   PainLoc: Abdomen       Physical Exam  Vitals signs and nursing note reviewed. Neck:      Musculoskeletal: Normal range of motion and neck supple. Thyroid: No thyromegaly. Cardiovascular:      Rate and Rhythm: Normal rate and regular rhythm. Heart sounds: Normal heart sounds. Pulmonary:      Effort: Pulmonary effort is normal.      Breath sounds: Normal breath sounds. Musculoskeletal: Normal range of motion. Skin:     General: Skin is warm and dry. Neurological:      Mental Status: She is alert and oriented to person, place, and time. Psychiatric:         Mood and Affect: Mood normal.         Behavior: Behavior normal.         Thought Content: Thought content normal.         Judgment: Judgment normal.         Assessment/Plan      ICD-10-CM ICD-9-CM    1. Essential hypertension  I10 401.9 cloNIDine HCL (CATAPRES) 0.2 mg tablet      potassium chloride (Klor-Con M20) 20 mEq tablet   2. 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 Controlled with Dilantin  R56.9 780.39 butalbital-acetaminophen-caffeine (FIORICET, ESGIC) -40 mg per tablet      zolpidem (AMBIEN) 10 mg tablet   3. Nausea  R11.0 787.02 omeprazole (PRILOSEC) 20 mg capsule      ondansetron (ZOFRAN ODT) 8 mg disintegrating tablet      lactulose (CHRONULAC) 10 gram/15 mL solution   4. Cirrhosis (HCC)  K74.60 571.5 spironolactone (Aldactone) 100 mg tablet      furosemide (Lasix) 40 mg tablet   5. Slow transit constipation  K59.01 564.01 REFERRAL TO GASTROENTEROLOGY   6. Encounter for screening mammogram for malignant neoplasm of breast  Z12.31 V76.12 [facility] MAMMO BI SCREENING INCL CAD     Follow-up and Dispositions    · Return in about 6 months (around 11/6/2021). I have discussed the diagnosis with the patient and the intended plan of care as seen in the above orders. The patient has received an after-visit summary and questions were answered concerning future plans.  I have discussed medication, side effects, and warnings with the patient in detail. The patient verbalized understanding and is in agreement with the plan of care. The patient will contact the office with any additional concerns.       lab results and schedule of future lab studies reviewed with patient    Ciaran Cisse MD

## 2021-05-06 NOTE — PROGRESS NOTES
Patient c/o abdominal pain x 3 weeks as well as constipation. Has h/o bowel obstructions. 1. Have you been to the ER, urgent care clinic since your last visit? Hospitalized since your last visit? Seen at [facility] ER last Friday  2. Have you seen or consulted any other health care providers outside of the [address] [place] [address] since your last visit? Include any pap smears or colon screening. No    Medication reconciliation has been completed with patient. Care team discussed/updated as well as pharmacy.     Health Maintenance Due   Topic Date Due    Hepatitis C Screening  Never done    COVID-19 Vaccine (1) Never done    PAP AKA CERVICAL CYTOLOGY  Never done    Lipid Screen  Never done    Shingrix Vaccine Age 50> (1 of 2) Never done    Colorectal Cancer Screening Combo  Never done    Breast Cancer Screen Mammogram  Never done

## 2021-05-12 ENCOUNTER — TELEPHONE (OUTPATIENT)
Dept: FAMILY MEDICINE CLINIC | Age: 56
End: 2021-05-12

## 2021-05-12 DIAGNOSIS — R10.9 ABDOMINAL PAIN, UNSPECIFIED ABDOMINAL LOCATION: ICD-10-CM

## 2021-05-12 DIAGNOSIS — R11.0 NAUSEA: Primary | ICD-10-CM

## 2021-05-12 DIAGNOSIS — K56.609 SMALL BOWEL OBSTRUCTION (HCC): ICD-10-CM

## 2021-05-12 NOTE — TELEPHONE ENCOUNTER
04/23/2021went to [facility] ER for abdominal pain and pt has seen Dr. Modesto Pedraza since then. The medication from the ER was dicyclomine 20mg and sucrafe 1mg. She wants to know if Dr. Modesto Pedraza can prescribe this again until she can see the gastroenterologist. She is in severe pain and is asking for a return call today.

## 2021-05-13 RX ORDER — SUCRALFATE 1 G/1
1 TABLET ORAL 4 TIMES DAILY
Qty: 50 TAB | Refills: 1 | Status: SHIPPED | OUTPATIENT
Start: 2021-05-13 | End: 2021-06-04 | Stop reason: SDUPTHER

## 2021-05-13 RX ORDER — DICYCLOMINE HYDROCHLORIDE 20 MG/1
20 TABLET ORAL EVERY 6 HOURS
Qty: 50 TAB | Refills: 1 | Status: SHIPPED | OUTPATIENT
Start: 2021-05-13 | End: 2021-06-08

## 2021-05-13 NOTE — TELEPHONE ENCOUNTER
Pt has been made aware that dr Lady Adkins is [facility] Pt told me to thank dr Lady Adkins. Advised her to call office if she runs into any issues.

## 2021-05-14 ENCOUNTER — HOSPITAL ENCOUNTER (EMERGENCY)
Age: 56
Discharge: HOME | End: 2021-05-14
Attending: STUDENT IN AN ORGANIZED HEALTH CARE EDUCATION/TRAINING PROGRAM
Payer: COMMERCIAL

## 2021-05-14 ENCOUNTER — APPOINTMENT (OUTPATIENT)
Dept: CT IMAGING | Age: 56
End: 2021-05-14
Attending: STUDENT IN AN ORGANIZED HEALTH CARE EDUCATION/TRAINING PROGRAM
Payer: COMMERCIAL

## 2021-05-14 VITALS
HEART RATE: 76 BPM | SYSTOLIC BLOOD PRESSURE: 148 MMHG | DIASTOLIC BLOOD PRESSURE: 88 MMHG | TEMPERATURE: 98.6 F | WEIGHT: 194 LBS | RESPIRATION RATE: 18 BRPM | BODY MASS INDEX: 29.4 KG/M2 | HEIGHT: 68 IN | OXYGEN SATURATION: 100 %

## 2021-05-14 DIAGNOSIS — E86.0 DEHYDRATION: ICD-10-CM

## 2021-05-14 DIAGNOSIS — R10.84 ABDOMINAL PAIN, GENERALIZED: Primary | ICD-10-CM

## 2021-05-14 DIAGNOSIS — R11.2 NON-INTRACTABLE VOMITING WITH NAUSEA, UNSPECIFIED VOMITING TYPE: ICD-10-CM

## 2021-05-14 DIAGNOSIS — E16.2 HYPOGLYCEMIA: ICD-10-CM

## 2021-05-14 DIAGNOSIS — K59.00 CONSTIPATION, UNSPECIFIED CONSTIPATION TYPE: ICD-10-CM

## 2021-05-14 LAB
ALBUMIN SERPL-MCNC: 2.6 G/DL (ref 3.4–5)
ALBUMIN/GLOB SERPL: 0.4 {RATIO} (ref 0.8–1.7)
ALP SERPL-CCNC: 237 U/L (ref 45–117)
ALT SERPL-CCNC: 20 U/L (ref 13–56)
ANION GAP SERPL CALC-SCNC: 10 MMOL/L (ref 3–18)
AST SERPL-CCNC: 14 U/L (ref 10–38)
BASOPHILS # BLD: 0 K/UL (ref 0–0.1)
BASOPHILS NFR BLD: 0 % (ref 0–2)
BILIRUB DIRECT SERPL-MCNC: <0.1 MG/DL (ref 0–0.2)
BILIRUB SERPL-MCNC: 0.2 MG/DL (ref 0.2–1)
BUN SERPL-MCNC: 10 MG/DL (ref 7–18)
BUN/CREAT SERPL: 17 (ref 12–20)
CALCIUM SERPL-MCNC: 9 MG/DL (ref 8.5–10.1)
CHLORIDE SERPL-SCNC: 103 MMOL/L (ref 100–111)
CO2 SERPL-SCNC: 28 MMOL/L (ref 21–32)
CREAT SERPL-MCNC: 0.6 MG/DL (ref 0.6–1.3)
DIFFERENTIAL METHOD BLD: ABNORMAL
EOSINOPHIL # BLD: 0.3 K/UL (ref 0–0.4)
EOSINOPHIL NFR BLD: 4 % (ref 0–5)
ERYTHROCYTE [DISTWIDTH] IN BLOOD BY AUTOMATED COUNT: 13.2 % (ref 11.6–14.5)
GLOBULIN SER CALC-MCNC: 6.3 G/DL (ref 2–4)
GLUCOSE BLD STRIP.AUTO-MCNC: 90 MG/DL (ref 70–110)
GLUCOSE SERPL-MCNC: 51 MG/DL (ref 74–99)
HCG SERPL QL: NEGATIVE
HCT VFR BLD AUTO: 34.6 % (ref 35–45)
HGB BLD-MCNC: 11.6 G/DL (ref 12–16)
LIPASE SERPL-CCNC: 185 U/L (ref 73–393)
LYMPHOCYTES # BLD: 1.7 K/UL (ref 0.9–3.6)
LYMPHOCYTES NFR BLD: 21 % (ref 21–52)
MCH RBC QN AUTO: 31.3 PG (ref 24–34)
MCHC RBC AUTO-ENTMCNC: 33.5 G/DL (ref 31–37)
MCV RBC AUTO: 93.3 FL (ref 74–97)
MONOCYTES # BLD: 0.5 K/UL (ref 0.05–1.2)
MONOCYTES NFR BLD: 7 % (ref 3–10)
NEUTS SEG # BLD: 5.4 K/UL (ref 1.8–8)
NEUTS SEG NFR BLD: 68 % (ref 40–73)
PLATELET # BLD AUTO: 598 K/UL (ref 135–420)
PMV BLD AUTO: 8.6 FL (ref 9.2–11.8)
POTASSIUM SERPL-SCNC: 3.4 MMOL/L (ref 3.5–5.5)
PROT SERPL-MCNC: 8.9 G/DL (ref 6.4–8.2)
RBC # BLD AUTO: 3.71 M/UL (ref 4.2–5.3)
SODIUM SERPL-SCNC: 141 MMOL/L (ref 136–145)
WBC # BLD AUTO: 7.9 K/UL (ref 4.6–13.2)

## 2021-05-14 PROCEDURE — 82962 GLUCOSE BLOOD TEST: CPT

## 2021-05-14 PROCEDURE — 99283 EMERGENCY DEPT VISIT LOW MDM: CPT

## 2021-05-14 PROCEDURE — 80048 BASIC METABOLIC PNL TOTAL CA: CPT

## 2021-05-14 PROCEDURE — 96361 HYDRATE IV INFUSION ADD-ON: CPT

## 2021-05-14 PROCEDURE — 83690 ASSAY OF LIPASE: CPT

## 2021-05-14 PROCEDURE — 85025 COMPLETE CBC W/AUTO DIFF WBC: CPT

## 2021-05-14 PROCEDURE — 74011250636 HC RX REV CODE- 250/636: Performed by: STUDENT IN AN ORGANIZED HEALTH CARE EDUCATION/TRAINING PROGRAM

## 2021-05-14 PROCEDURE — 96375 TX/PRO/DX INJ NEW DRUG ADDON: CPT

## 2021-05-14 PROCEDURE — 96374 THER/PROPH/DIAG INJ IV PUSH: CPT

## 2021-05-14 PROCEDURE — 96376 TX/PRO/DX INJ SAME DRUG ADON: CPT

## 2021-05-14 PROCEDURE — 80076 HEPATIC FUNCTION PANEL: CPT

## 2021-05-14 PROCEDURE — 74011000250 HC RX REV CODE- 250

## 2021-05-14 PROCEDURE — 74011000636 HC RX REV CODE- 636: Performed by: STUDENT IN AN ORGANIZED HEALTH CARE EDUCATION/TRAINING PROGRAM

## 2021-05-14 PROCEDURE — 84703 CHORIONIC GONADOTROPIN ASSAY: CPT

## 2021-05-14 PROCEDURE — 74177 CT ABD & PELVIS W/CONTRAST: CPT

## 2021-05-14 RX ORDER — POLYETHYLENE GLYCOL 3350 17 G/17G
17 POWDER, FOR SOLUTION ORAL DAILY
Qty: 507 G | Refills: 0 | Status: SHIPPED | OUTPATIENT
Start: 2021-05-14 | End: 2021-06-13

## 2021-05-14 RX ORDER — MORPHINE SULFATE 4 MG/ML
4 INJECTION INTRAVENOUS
Status: COMPLETED | OUTPATIENT
Start: 2021-05-14 | End: 2021-05-14

## 2021-05-14 RX ORDER — MAGNESIUM CITRATE
SOLUTION, ORAL ORAL
Qty: 1 BOTTLE | Refills: 0 | Status: SHIPPED | OUTPATIENT
Start: 2021-05-14

## 2021-05-14 RX ORDER — DEXTROSE 50 % IN WATER (D50W) INTRAVENOUS SYRINGE
Status: COMPLETED
Start: 2021-05-14 | End: 2021-05-14

## 2021-05-14 RX ORDER — DEXTROSE 50 % IN WATER (D50W) INTRAVENOUS SYRINGE
12.5
Status: COMPLETED | OUTPATIENT
Start: 2021-05-14 | End: 2021-05-14

## 2021-05-14 RX ORDER — ONDANSETRON 2 MG/ML
4 INJECTION INTRAMUSCULAR; INTRAVENOUS
Status: COMPLETED | OUTPATIENT
Start: 2021-05-14 | End: 2021-05-14

## 2021-05-14 RX ADMIN — IOPAMIDOL 100 ML: 612 INJECTION, SOLUTION INTRAVENOUS at 14:16

## 2021-05-14 RX ADMIN — DEXTROSE MONOHYDRATE 12.5 G: 25 INJECTION, SOLUTION INTRAVENOUS at 11:42

## 2021-05-14 RX ADMIN — MORPHINE SULFATE 4 MG: 4 INJECTION, SOLUTION INTRAMUSCULAR; INTRAVENOUS at 11:18

## 2021-05-14 RX ADMIN — SODIUM CHLORIDE 1000 ML: 900 INJECTION, SOLUTION INTRAVENOUS at 11:18

## 2021-05-14 RX ADMIN — DIATRIZOATE MEGLUMINE AND DIATRIZOATE SODIUM 30 ML: 600; 100 SOLUTION ORAL; RECTAL at 12:09

## 2021-05-14 RX ADMIN — ONDANSETRON 4 MG: 2 INJECTION INTRAMUSCULAR; INTRAVENOUS at 11:17

## 2021-05-14 RX ADMIN — DEXTROSE 50 % IN WATER (D50W) INTRAVENOUS SYRINGE 12.5 G: at 11:42

## 2021-05-14 RX ADMIN — MORPHINE SULFATE 4 MG: 4 INJECTION, SOLUTION INTRAMUSCULAR; INTRAVENOUS at 14:48

## 2021-05-14 NOTE — ED TRIAGE NOTES
Lower abdominal pain, onset 2 weeks, with nausea, no bowel movement for the past three days, Hx of gastric bypass and bowel obstruction

## 2021-05-14 NOTE — ED PROVIDER NOTES
51-year-old female with past medical history significant for hypertension, gastric bypass, small bowel obstruction status post ex lap and cirrhosis presents to the ED with complaint of persistent abdominal pain over the past 2 weeks. She describes the discomfort as a crampy, dull sensation that was initially intermittent in nature but has become constant over the past 2 days. It is associated with nausea for which she has been taking Zofran but she has not vomited. Her last bowel movement was 2 days ago. She has not passed any flatus since yesterday evening. She came her for evaluation because her pain is not getting any better. She has barely been able to eat or drink anything as a result of her symptoms. She denies smoking, drinking or recreational drug use. Family history reviewed and noncontributory. Past Medical History:   Diagnosis Date    Anasarca     With ascites. Hospitalized 11/10. Resolved with diuretics. 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 Usually controlled with Dilantin.     Small bowel obstruction ([address] 07/2010       Past Surgical History:   Procedure Laterality Date    HX GASTRIC BYPASS  2002    HX HIP REPLACEMENT  1991    Right hip replacement    HX KNEE REPLACEMENT           Family History:   Problem Relation Age of Onset    No Known Problems Mother     Heart Disease Father     Heart Attack Father        Social History     Socioeconomic History    Marital status:      Spouse name: Not on file    Number of children: Not on file    Years of education: Not on file    Highest education level: Not on file   Occupational History    Not on file   Social Needs    Financial resource strain: Not on file    Food insecurity     Worry: Not on file     Inability: Not on file   [facility] Transportation needs     Medical: Not on file     Non-medical: Not on file   Tobacco Use    Smoking status: Former Smoker    Smokeless tobacco: Never Used   Substance and Sexual Activity    Alcohol use: No    Drug use: Not on file    Sexual activity: Not on file   Lifestyle    Physical activity     Days per week: Not on file     Minutes per session: Not on file    Stress: Not on file   Relationships    Social connections     Talks on phone: Not on file     Gets together: Not on file     Attends [employer] service: Not on file     Active member of club or organization: Not on file     Attends meetings of clubs or organizations: Not on file     Relationship status: Not on file    Intimate partner violence     Fear of current or ex partner: Not on file     Emotionally abused: Not on file     Physically abused: Not on file     Forced sexual activity: Not on file   Other Topics Concern    Not on file   Social History Narrative    Not on file         ALLERGIES: Latex, Codeine, and Keflex [cephalexin]    Review of Systems   Constitutional: Negative for activity change and appetite change. HENT: Negative for drooling and facial swelling. Eyes: Negative for pain and discharge. Respiratory: Negative for apnea and choking. Cardiovascular: Negative for palpitations and leg swelling. Gastrointestinal: Positive for abdominal pain, constipation, nausea and vomiting. Negative for blood in stool and rectal pain. Endocrine: Negative for polydipsia and polyphagia. Genitourinary: Negative for genital sores and hematuria. Musculoskeletal: Negative for gait problem and neck stiffness. Skin: Negative for color change and rash. Allergic/Immunologic: Negative for environmental allergies. Neurological: Negative for tremors. Hematological: Negative for adenopathy. Psychiatric/Behavioral: Negative for agitation and behavioral problems.        Vitals:    05/14/21 1012   BP: (!) 148/88   Pulse: 76   Resp: 18 Temp: 98.6 °F (37 °C)   SpO2: 100%   Weight: 88 kg (194 lb)   Height: 5' 8\" (1.727 m)            Physical Exam  Vitals signs and nursing note reviewed. Constitutional:       Appearance: Normal appearance. She is obese. HENT:      Head: Normocephalic and atraumatic. Eyes:      Extraocular Movements: Extraocular movements intact. Pupils: Pupils are equal, round, and reactive to light. Cardiovascular:      Rate and Rhythm: Normal rate and regular rhythm. Heart sounds: Normal heart sounds. No murmur. No friction rub. Pulmonary:      Effort: Pulmonary effort is normal.      Breath sounds: Normal breath sounds. Abdominal:      Comments: Mildly distended and diffuse tenderness to palpation worst in the epigastrium and bilateral upper quadrants. Well-healed midline surgical scar present. Musculoskeletal: Normal range of motion. Skin:     General: Skin is warm and dry. Capillary Refill: Capillary refill takes less than 2 seconds. Neurological:      General: No focal deficit present. Mental Status: She is alert and oriented to person, place, and time. Psychiatric:         Mood and Affect: Mood normal.          MDM  Number of Diagnoses or Management Options  Abdominal pain, generalized  Constipation, unspecified constipation type  Dehydration  Hypoglycemia  Non-intractable vomiting with nausea, unspecified vomiting type  Diagnosis management comments: 61-year-old female with past medical history significant for gastric bypass and small bowel obstruction status post ex lap presents to the ED with progressively worsening abdominal pain over the past several weeks and inability to pass flatus or have a bowel movement since last night. Concern for possible recurrent small bowel obstruction. Will perform full laboratory work-up and obtain CT of the abdomen pelvis with oral and IV contrast to assess for any acute intra-abdominal pathology.   Symptomatic control with IV fluids, morphine and Zofran. Will reassess. Laboratory work-up is significant for hypoglycemia to 51. Nurse gave the patient some juice which she was able to drink without difficulty and also gave her a 12.5 g IV bolus of dextrose. She has a chronically elevated alk phos which is 237 today. Pregnancy test negative. CT of the abdomen/pelvis shows:  IMPRESSION  1. Postsurgical changes of gastric bypass. The Vivien/alimentary limb is mildly  dilated to the patulous anastomosis, but oral contrast has progressed well  beyond the anastomosis. No definite evidence for obstruction. Oral contrast has  not yet reached the cecum, but this is favored to be related to timing of the  study after contrast administration.     2. Small right lateral lower abdominal wall hernia contains a loop of small  bowel, but there is also no evidence for obstruction or incarceration at this  point. Recommend clinical correlation.     3. Intra and extrahepatic biliary ductal dilatation is likely due to reservoir  effect status post cholecystectomy. No obstructing stone or mass is seen.     4. Moderate stool burden, correlate for constipation. Repeat fingerstick glucose has improved to 90. Upon reevaluation, patient states that she feels significantly better. She no longer has abdominal pain or feels nauseous and is able to tolerate p.o. without difficulty. She is stable for discharge home with prescriptions for MiraLAX and magnesium citrate as needed. She was encouraged to follow-up with a primary doctor as outpatient. Pt has been reexamined. Patient has no new complaints, changes, or physical findings. Care plan outlined and precautions discussed. Results were reviewed with the patient. All medications were reviewed with the patient; will d/c home with PMD f/u. All of pt's questions and concerns were addressed. Patient was instructed and agrees to follow up with PMD, as well as to return to the ED upon further deterioration.  Patient is ready to go home.    This note was dictated utilizing voice recognition software which may lead to typographical errors.  I apologize in advance if the situation occurs.  If questions arise please do not hesitate to contact me or call our department.     Sabrina Zafar, DO           Procedures

## 2021-05-18 RX ORDER — GABAPENTIN 300 MG/1
300 CAPSULE ORAL 3 TIMES DAILY
Qty: 90 CAP | Status: CANCELLED | OUTPATIENT
Start: 2021-05-18

## 2021-05-18 NOTE — TELEPHONE ENCOUNTER
Patient called requesting a new order for her gabapentin (NEURONTIN) 300 mg capsule and baclofen (LIORESAL) 10 mg tablet , that was prescribed by a historical provider. Please review and advise. LOV 05/06/21No upcoming scheduled. Patient can be reached at [phone].

## 2021-05-26 DIAGNOSIS — R56.9 SEIZURE (HCC): ICD-10-CM

## 2021-05-26 NOTE — TELEPHONE ENCOUNTER
Patient also has for dilantin and keppra. She was referred to neurology back in September 2020.  Provided her with that information and to call and schedule an appointment

## 2021-05-26 NOTE — TELEPHONE ENCOUNTER
Patient is calling asking for refills on the following. She states she has been taking Baclofen for years & she discussed this just recently with Dr. Gail Shea in her May appointment. Please review and advise. Requested Prescriptions     Pending Prescriptions Disp Refills    baclofen (LIORESAL) 10 mg tablet       Sig: Take 1 Tablet by mouth.

## 2021-05-28 ENCOUNTER — TELEPHONE (OUTPATIENT)
Dept: FAMILY MEDICINE CLINIC | Age: 56
End: 2021-05-28

## 2021-05-28 DIAGNOSIS — K74.60 CIRRHOSIS (HCC): ICD-10-CM

## 2021-05-28 RX ORDER — FUROSEMIDE 40 MG/1
TABLET ORAL
Qty: 90 TABLET | Refills: 1 | Status: SHIPPED | OUTPATIENT
Start: 2021-05-28 | End: 2021-06-25

## 2021-05-28 NOTE — TELEPHONE ENCOUNTER
Patient called back today in ref to her baclofen (LIORESAL) 10 mg tablet. Medication is still pending for review. Patient can be reached at [phone].

## 2021-06-03 RX ORDER — BACLOFEN 10 MG/1
10 TABLET ORAL
Qty: 40 TABLET | Refills: 0 | Status: SHIPPED | OUTPATIENT
Start: 2021-06-03 | End: 2021-06-28 | Stop reason: SDUPTHER

## 2021-06-03 RX ORDER — BUTALBITAL, ACETAMINOPHEN AND CAFFEINE 50; 325; 40 MG/1; MG/1; MG/1
TABLET ORAL
Qty: 24 TABLET | Refills: 1 | Status: SHIPPED | OUTPATIENT
Start: 2021-06-03 | End: 2021-06-28

## 2021-06-04 DIAGNOSIS — R10.9 ABDOMINAL PAIN, UNSPECIFIED ABDOMINAL LOCATION: ICD-10-CM

## 2021-06-04 DIAGNOSIS — K56.609 SMALL BOWEL OBSTRUCTION (HCC): ICD-10-CM

## 2021-06-04 DIAGNOSIS — R11.0 NAUSEA: ICD-10-CM

## 2021-06-07 DIAGNOSIS — R10.9 ABDOMINAL PAIN, UNSPECIFIED ABDOMINAL LOCATION: ICD-10-CM

## 2021-06-07 DIAGNOSIS — K56.609 SMALL BOWEL OBSTRUCTION (HCC): ICD-10-CM

## 2021-06-07 DIAGNOSIS — R11.0 NAUSEA: ICD-10-CM

## 2021-06-07 RX ORDER — SUCRALFATE 1 G/1
1 TABLET ORAL 4 TIMES DAILY
Qty: 50 TABLET | Refills: 1 | Status: SHIPPED | OUTPATIENT
Start: 2021-06-07

## 2021-06-08 ENCOUNTER — TELEPHONE (OUTPATIENT)
Dept: FAMILY MEDICINE CLINIC | Age: 56
End: 2021-06-08

## 2021-06-08 DIAGNOSIS — K59.01 SLOW TRANSIT CONSTIPATION: Primary | ICD-10-CM

## 2021-06-08 RX ORDER — DICYCLOMINE HYDROCHLORIDE 20 MG/1
TABLET ORAL
Qty: 50 TABLET | Refills: 1 | Status: SHIPPED | OUTPATIENT
Start: 2021-06-08

## 2021-06-08 NOTE — TELEPHONE ENCOUNTER
Dr. Brendon Sawyer has requested that pt go see [facility] for gastro. She is at risk to go under anesthesia. She has not gone to the bathroom in over a week. States she is so much pain and she says that she can not go any longer \"I need help:. She needs to have a scope, but can't go under anesthesia. She wants an answer today. In the middle of talking to her the phone went silent. I told her if she can hear me to call me back. Dr Fide Gaytan she would like to speak with you.

## 2021-06-09 ENCOUNTER — TELEPHONE (OUTPATIENT)
Dept: FAMILY MEDICINE CLINIC | Age: 56
End: 2021-06-09

## 2021-06-09 NOTE — TELEPHONE ENCOUNTER
Pt returned call and was made aware of [facility]'s protocol. I told her I have faxed over records request to dr guaman's office she said she would also call them as well. Told her once I get records I will fax urgent referral to [facility]. Pt expressed clear understanding and had no further questions.

## 2021-06-09 NOTE — TELEPHONE ENCOUNTER
Mrs. Francine Hathaway called stating that she called Dr. Supriya Mehta office and the office informed her that our office can see the notes in the system.  Please call patient back at [phone]

## 2021-06-09 NOTE — TELEPHONE ENCOUNTER
Called pt. Phone rang once, went silent. No answer. I have requested records from Dr. Rosalind Meyers office. I will need these in order to get her an 'urgent' referral. David Bailey told me it could take anywhere from 2 hours to 2 days to receive records. I asked if they could send them as soon as possible. She said she would try. I was also going to tell pt that she can call Dr. Rosalind Meyers office as well which may possibly expedite receiving records.

## 2021-06-09 NOTE — TELEPHONE ENCOUNTER
Called [facility] and registered pt. They do not have appts until Sept. However if it's urgent then I can fax over notes and opal it as urgent. The nurse will then give the doctor the notes to review and then they possibly do work her in. I was given  u gi fax [phone]. I am trying to obtain records from Dr. Jose Rossi as he is the one who advised her to go to u. Called dr Gerry Pike office. Office closed for lunch until 1230.

## 2021-06-09 NOTE — TELEPHONE ENCOUNTER
Mrs. Meenakshi Matthews is calling to speak with Gabe Lamas. She states it's very urgent and her  is at home with her today in case she needs to go. She is asking for a call back as soon as possible. [phone].

## 2021-06-10 NOTE — TELEPHONE ENCOUNTER
Patient called again mid morning asking to speak with Sonya. Yessi Cole put her on hold to ask Renee Garcia if she was available. Renee Garcia was currently in patient care. Yessi Cole was unavailable to speak back w/her. I picked up the phone and explained to Mrs. Toscano there are multiple messages in her chart, that Lisa was in patient care at the moment, but she would call her back. Mrs. Jessica Latham stated that she has been in pain, waiting on a call & we don't care about her laying there in pain. She proceeded to say \"if y'all aren't going to stay on top of this then I need you to let me know so I can find another doctor. I am aggravated with Renee Garcia right now, because this is ridiculous. She needs to let me know what is going on. \"   I asked her if she would like to hold until  Renee Garcia was out of the room she is more than welcome to. She agreed to hold & call was transferred to Renee Garcia.

## 2021-06-10 NOTE — TELEPHONE ENCOUNTER
Patient called back this am.  I let her know Laura Weeks just walked in and had just taken another call was there something I could assist with. She then let me know her name and I knew Laura Weeks has been handling this. I let her know I would make Laura Weeks aware she had called again.

## 2021-06-11 NOTE — TELEPHONE ENCOUNTER
I picked up the line and spoke with Ms. Johnny Florez. I explained to her that I received a fax from Dr. Lucian Glover office that she hadn't seen him, but that he reviewed her record and advised going to  [facility]. I told her I was obtaining/prinitng records from her ER, surgeon visit and faxing them to [facility] marked urgently. I explained to pt that I understand she is in pain, but unfortunately this isn't a super quick process even when marked 'urgent', but I am doing my best to help her. I gave pt the number to [facility] and told her she could call them later in the afternoon to follow up. Pt repeated back number and expressed understanding. At no point did pt say anything to me about being aggravated with me. I faxed almost 50 pages of records to [facility] and received confirmation.

## 2021-06-21 DIAGNOSIS — R56.9 SEIZURE (HCC): ICD-10-CM

## 2021-06-21 NOTE — TELEPHONE ENCOUNTER
Patient is calling to obtain the following refills:     Requested Prescriptions     Pending Prescriptions Disp Refills    butalbital-acetaminophen-caffeine (FIORICET, ESGIC) -40 mg per tablet [Pharmacy Med Name: [id number] -40] 24 Tablet 1     Sig: TAKE 1 TABLET BY MOUTH EVERY 4 HOURS AS NEEDED FOR HEADACHES    baclofen (LIORESAL) 10 mg tablet [Pharmacy Med Name: BACLOFEN 10 MG TABLET] 40 Tablet 0     Sig: TAKE 1 TABLET BY MOUTH TWO (2) TIMES DAILY AS NEEDED FOR MUSCLE SPASM(S).      Please review and sign if appropriate

## 2021-06-22 RX ORDER — BUTALBITAL, ACETAMINOPHEN AND CAFFEINE 50; 325; 40 MG/1; MG/1; MG/1
TABLET ORAL
Qty: 24 TABLET | Refills: 1 | OUTPATIENT
Start: 2021-06-22

## 2021-06-22 RX ORDER — BACLOFEN 10 MG/1
10 TABLET ORAL 3 TIMES DAILY
OUTPATIENT
Start: 2021-06-22

## 2021-06-22 NOTE — TELEPHONE ENCOUNTER
Patient is calling to check the status of the refills. Please respond today. Call back number is [phone].

## 2021-06-22 NOTE — TELEPHONE ENCOUNTER
baclofen (LIORESAL) 10 mg tablet [[id number]]     Order Details  Dose: 10 mg Route: Oral Frequency: 2 TIMES DAILY AS NEEDED for Muscle Spasm(s)   Dispense Quantity: 40 Tablet Refills: 0          Sig: Take 1 Tablet by mouth two (2) times daily as needed for Muscle Spasm(s).           butalbital-acetaminophen-caffeine (FIORICET, ESGIC) -40 mg per tablet [[id number]]     Order Details  Dose, Route, Frequency: As Directed   Dispense Quantity: 24 Tablet Refills: 1    Note to Pharmacy: DX Code Needed  PATIENT IS REQUESTING A REFILL.         Sig: TAKE 1 TABLET BY MOUTH EVERY 4 HOURS AS NEEDED FOR HEADACHES         Start Date: 06/03/21 End Date: --     Above medications called into pharmacy voicemail. Repeated back pt info, prescriptions, and left office number in case pharmacy has any questions.

## 2021-06-24 ENCOUNTER — TELEPHONE (OUTPATIENT)
Dept: FAMILY MEDICINE CLINIC | Age: 56
End: 2021-06-24

## 2021-06-24 NOTE — TELEPHONE ENCOUNTER
Patient called in ref to a PA , that [facility], [address] [place] [address], states that they faxed over in April. Please review and advise as soon as possible. Patient can be reached at [phone].

## 2021-06-25 DIAGNOSIS — K74.60 CIRRHOSIS (HCC): ICD-10-CM

## 2021-06-25 RX ORDER — FUROSEMIDE 40 MG/1
TABLET ORAL
Qty: 90 TABLET | Refills: 1 | Status: SHIPPED | OUTPATIENT
Start: 2021-06-25 | End: 2021-07-19

## 2021-06-28 ENCOUNTER — TELEPHONE (OUTPATIENT)
Dept: FAMILY MEDICINE CLINIC | Age: 56
End: 2021-06-28

## 2021-06-28 ENCOUNTER — VIRTUAL VISIT (OUTPATIENT)
Dept: FAMILY MEDICINE CLINIC | Age: 56
End: 2021-06-28
Payer: COMMERCIAL

## 2021-06-28 DIAGNOSIS — F51.01 PRIMARY INSOMNIA: Primary | ICD-10-CM

## 2021-06-28 DIAGNOSIS — R56.9 SEIZURE (HCC): ICD-10-CM

## 2021-06-28 PROCEDURE — 99214 OFFICE O/P EST MOD 30 MIN: CPT | Performed by: FAMILY MEDICINE

## 2021-06-28 RX ORDER — BUTALBITAL, ACETAMINOPHEN AND CAFFEINE 50; 325; 40 MG/1; MG/1; MG/1
1 TABLET ORAL
Qty: 60 TABLET | Refills: 1 | Status: SHIPPED | OUTPATIENT
Start: 2021-06-28 | End: 2021-08-18 | Stop reason: SDUPTHER

## 2021-06-28 RX ORDER — BACLOFEN 10 MG/1
10 TABLET ORAL
Qty: 60 TABLET | Refills: 1 | Status: SHIPPED | OUTPATIENT
Start: 2021-06-28 | End: 2021-07-29

## 2021-06-28 NOTE — PROGRESS NOTES
Elba Iqbal is a 49 y.o. female who was seen by synchronous (real-time) audio-video technology on 6/28/2021 for Follow-up (needs meds for insomnia. no ideas of suicide or homicide.  )        Assessment & Plan:   Diagnoses and all orders for this visit:    1. Primary insomnia  -     SLEEP MEDICINE REFERRAL    2. 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 Controlled with Dilantin  -     butalbital-acetaminophen-caffeine (FIORICET, ESGIC) -40 mg per tablet; Take 1 Tablet by mouth two (2) times daily as needed for Headache.  -     baclofen (LIORESAL) 10 mg tablet; Take 1 Tablet by mouth two (2) times daily as needed for Muscle Spasm(s). 712  Subjective:       Prior to Admission medications    Medication Sig Start Date End Date Taking? Authorizing Provider   furosemide (LASIX) 40 mg tablet TAKE 1 TABLET BY MOUTH THREE TIMES A DAY 6/25/21   Senia Casas MD   dicyclomine (BENTYL) 20 mg tablet TAKE 1 TAB BY MOUTH EVERY SIX HOURS 6/8/21   Senia Casas MD   sucralfate (Carafate) 1 gram tablet Take 1 Tablet by mouth four (4) times daily. 6/7/21   Senia Casas MD   baclofen (LIORESAL) 10 mg tablet Take 1 Tablet by mouth two (2) times daily as needed for Muscle Spasm(s). 6/3/21   Senia Casas MD   butalbital-acetaminophen-caffeine (FIORICET, ESGIC) -40 mg per tablet TAKE 1 TABLET BY MOUTH EVERY 4 HOURS AS NEEDED FOR HEADACHES 6/3/21   Senia Casas MD   magnesium citrate solution Take 1/3 of bottle every 8 hours until finished or until you have a bowel movement. 5/14/21   Jaron Christine DO   omeprazole (PRILOSEC) 20 mg capsule Take 1 Cap by mouth daily. 5/6/21   Senia Casas MD   ondansetron (ZOFRAN ODT) 8 mg disintegrating tablet Take 1 Tab by mouth every eight (8) hours as needed for Nausea or Vomiting. 5/6/21   Senia Casas MD   lactulose (CHRONULAC) 10 gram/15 mL solution TAKE 30 ML BY MOUTH THREE (3) TIMES DAILY.  5/6/21   Senia Casas MD   spironolactone (Aldactone) 100 mg tablet Take 1 Tab by mouth three (3) times daily. 5/6/21   Torrey Rader MD   cloNIDine HCL (CATAPRES) 0.2 mg tablet Take 1 Tab by mouth two (2) times a day. 5/6/21   Torrey Rader MD   potassium chloride (Klor-Con M20) 20 mEq tablet TAKE 1 TAB BY MOUTH ONCE A DAY. 5/6/21   Torrey Rader MD   levETIRAcetam (Keppra) 500 mg tablet Take 500 mg by mouth two (2) times a day. Provider, Historical   gabapentin (NEURONTIN) 300 mg capsule Take 300 mg by mouth three (3) times daily. Provider, Historical   rifaximin (XIFAXAN) 550 mg tablet Take 1 Tab by mouth two (2) times a day. 5/24/12   Jigar Baldwin NP   MULTIVITAMIN/IRON/FOLIC ACID (CENTRUM ULTRA WOMEN'S PO) Take  by mouth. Provider, Historical   METOPROLOL TARTRATE (LOPRESSOR PO) Take 25 mg by mouth every eight (8) hours. Provider, Historical   FENTANYL TD 12 mcg by TransDERmal route. Provider, Historical   CYANOCOBALAMIN, VITAMIN B-12, (VITAMIN B-12 PO) Take  by mouth. Provider, Historical   CALCIUM CARBONATE/VITAMIN D3 (CALCIUM + D PO) Take  by mouth. Provider, Historical   PHENYTOIN SODIUM EXTENDED (DILANTIN PO) Take 400 mg by mouth. 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 Controlled with Dilantin R56.9     Past Medical History:   Diagnosis Date    Anasarca     With ascites. Hospitalized 11/10. Resolved with diuretics.  Cirrhosis of liver ([facility] [place] [address]     Secondary to NAFLD    Headache, migraine     Hepatic encephalopathy ([facility] [place] [address]     Controlled with lactulose and Xifaxan    Hypertension     Seizures ([facility] [place] [address]     Secondary to migraine headaches. Usually controlled with Dilantin.     Small bowel obstruction ([facility] [place] [address] 07/2010 Review of Systems   Constitutional: Negative for chills, fever, malaise/fatigue and weight loss. Eyes: Negative for blurred vision. Respiratory: Negative for cough, shortness of breath and wheezing. Cardiovascular: Negative for chest pain. Gastrointestinal: Negative for nausea and vomiting. Musculoskeletal: Negative for myalgias. Skin: Negative for rash. Neurological: Positive for headaches. Negative for tingling, tremors, sensory change, speech change, focal weakness, seizures, loss of consciousness and weakness. Psychiatric/Behavioral: The patient has insomnia. Objective:   No flowsheet data found. General: alert, cooperative, no distress   Mental  status: normal mood, behavior, speech, dress, motor activity, and thought processes, able to follow commands   HENT: NCAT   Neck: no visualized mass   Resp: no respiratory distress   Neuro: no gross deficits   Skin: no discoloration or lesions of concern on visible areas   Psychiatric: normal affect, consistent with stated mood, no evidence of hallucinations     Additional exam findings: We discussed the expected course, resolution and complications of the diagnosis(es) in detail. Medication risks, benefits, costs, interactions, and alternatives were discussed as indicated. I advised her to contact the office if her condition worsens, changes or fails to improve as anticipated. She expressed understanding with the diagnosis(es) and plan. Susy Hernandez, was evaluated through a synchronous (real-time) audio-video encounter. The patient (or guardian if applicable) is aware that this is a billable service. Verbal consent to proceed has been obtained within the past 12 months. The visit was conducted pursuant to the emergency declaration under the [address] [place] [address], [address] [place] [address] authority and the [employer] and [employer] General Act.   Patient identification was verified, and a caregiver was present when appropriate. The patient was located in a state where the provider was credentialed to provide care.     Al Andersen MD

## 2021-06-28 NOTE — TELEPHONE ENCOUNTER
Pt's ambien PA was submitted to [employer] via [employer]. It has been approved. Faxed approval to pharmacy. Date: 06/28/2021  Naty Wilson  [id number] [address]  [facility]  Plan Member Name: Nehemias Allen  Plan Member ID: *[id number]  Prescriber Name: Naty Wilson  Prescriber Phone: [phone]  Prescriber Fax: [phone]  Plan-approved Criteria used for review: Insomnia Agents Post Limit  Dear Nehemias Allen:  [facility] received a request for coverage or an exception to the coverage  requirements of Ambien 10MG OR TABS for you. As long as you remain covered by your prescription drug plan and there are no  changes to your plan benefits, this request is approved for the following time period:  06/28/2021 - 06/27/2024  Approvals may be limited as follows: · By dosing limits. Dosing limits may be established in accordance with FDA  approved labeling, accepted compendia, evidence-based practice guidelines or  your prescription drug plan benefits;  · By indication. For some products, coverage may be available for select  indications only;  · By [employer] Drug Code [facility]). Drug products are identified by unique numerical  product identifiers, called NDCs, which identify the , strength,  dosage form, formulation and package size. Some NDCs may not be covered. If you have not already done so, you may ask your pharmacist to fill the prescription.

## 2021-07-17 DIAGNOSIS — K74.60 CIRRHOSIS (HCC): ICD-10-CM

## 2021-07-19 RX ORDER — FUROSEMIDE 40 MG/1
TABLET ORAL
Qty: 90 TABLET | Refills: 1 | Status: SHIPPED | OUTPATIENT
Start: 2021-07-19 | End: 2021-08-11

## 2021-07-19 NOTE — TELEPHONE ENCOUNTER
Patient was last seen on  06/28/2021. Can you approve if appropriate? If not I will call the patient for an appointment.      Requested Prescriptions     Pending Prescriptions Disp Refills    furosemide (LASIX) 40 mg tablet [Pharmacy Med Name: FUROSEMIDE 40 MG TABLET] 90 Tablet 1     Sig: TAKE 1 TABLET BY MOUTH THREE TIMES A DAY

## 2021-07-29 DIAGNOSIS — R56.9 SEIZURE (HCC): ICD-10-CM

## 2021-07-29 RX ORDER — BACLOFEN 10 MG/1
10 TABLET ORAL
Qty: 60 TABLET | Refills: 1 | Status: SHIPPED | OUTPATIENT
Start: 2021-07-29 | End: 2021-08-23

## 2021-08-11 DIAGNOSIS — K74.60 CIRRHOSIS (HCC): ICD-10-CM

## 2021-08-11 RX ORDER — FUROSEMIDE 40 MG/1
TABLET ORAL
Qty: 90 TABLET | Refills: 1 | Status: SHIPPED | OUTPATIENT
Start: 2021-08-11 | End: 2021-09-08

## 2021-08-18 DIAGNOSIS — R56.9 SEIZURE (HCC): ICD-10-CM

## 2021-08-18 RX ORDER — BUTALBITAL, ACETAMINOPHEN AND CAFFEINE 50; 325; 40 MG/1; MG/1; MG/1
1 TABLET ORAL
Qty: 60 TABLET | Refills: 1 | Status: SHIPPED | OUTPATIENT
Start: 2021-08-18 | End: 2021-09-27 | Stop reason: SDUPTHER

## 2021-08-21 DIAGNOSIS — R56.9 SEIZURE (HCC): ICD-10-CM

## 2021-08-23 RX ORDER — BACLOFEN 10 MG/1
10 TABLET ORAL
Qty: 60 TABLET | Refills: 1 | Status: SHIPPED | OUTPATIENT
Start: 2021-08-23 | End: 2021-09-15

## 2021-09-07 DIAGNOSIS — K74.60 CIRRHOSIS (HCC): ICD-10-CM

## 2021-09-08 RX ORDER — FUROSEMIDE 40 MG/1
TABLET ORAL
Qty: 90 TABLET | Refills: 1 | Status: SHIPPED | OUTPATIENT
Start: 2021-09-08 | End: 2021-10-06

## 2021-09-15 DIAGNOSIS — R56.9 SEIZURE (HCC): ICD-10-CM

## 2021-09-15 RX ORDER — BACLOFEN 10 MG/1
10 TABLET ORAL
Qty: 60 TABLET | Refills: 1 | Status: SHIPPED | OUTPATIENT
Start: 2021-09-15

## 2021-09-24 DIAGNOSIS — R73.9 ELEVATED BLOOD SUGAR: ICD-10-CM

## 2021-09-24 DIAGNOSIS — I10 ESSENTIAL HYPERTENSION: Primary | ICD-10-CM

## 2021-09-24 DIAGNOSIS — R56.9 SEIZURE (HCC): ICD-10-CM

## 2021-09-24 NOTE — TELEPHONE ENCOUNTER
Patient called requesting a refill on her butalbital-acetaminophen-caffeine (FIORICET, ESGIC) -40 mg per tablet and her METOPROLOL TARTRATE (LOPRESSOR PO. Please review and advise. Patient can be reached at [phone].

## 2021-09-27 ENCOUNTER — TELEPHONE (OUTPATIENT)
Dept: FAMILY MEDICINE CLINIC | Age: 56
End: 2021-09-27

## 2021-09-27 RX ORDER — METOPROLOL SUCCINATE 100 MG/1
100 TABLET, EXTENDED RELEASE ORAL DAILY
Qty: 30 TABLET | Refills: 2 | Status: SHIPPED | OUTPATIENT
Start: 2021-09-27 | End: 2021-11-18 | Stop reason: SDUPTHER

## 2021-09-27 RX ORDER — FLASH GLUCOSE SENSOR
KIT MISCELLANEOUS
Qty: 2 KIT | Refills: 5 | Status: SHIPPED | OUTPATIENT
Start: 2021-09-27

## 2021-09-27 RX ORDER — FLASH GLUCOSE SCANNING READER
EACH MISCELLANEOUS
Qty: 2 EACH | Refills: 5 | Status: SHIPPED | OUTPATIENT
Start: 2021-09-27

## 2021-09-27 RX ORDER — BUTALBITAL, ACETAMINOPHEN AND CAFFEINE 50; 325; 40 MG/1; MG/1; MG/1
1 TABLET ORAL
Qty: 60 TABLET | Refills: 1 | Status: SHIPPED | OUTPATIENT
Start: 2021-09-27

## 2021-09-27 NOTE — TELEPHONE ENCOUNTER
Medications were sent in per dr Eddie Cox. Pt also requested neuro referral. This was placed as well. Told to call pharmacy around 2 to see if they have her scripts, if not she can call the office. Pt expressed clear understanding and had no further questions.

## 2021-09-27 NOTE — TELEPHONE ENCOUNTER
Mrs. Nelson Chong is calling this morning to request a refill on her Metoprolol and Fioricet. Also, she was recently in [place] at the  of her father in law and lost her [place] system. She wants to know if Dr. Lela Rodriguez could send in a new script for this this for her to start over. She uses [facility] on [address] in [place], [place]. She called last week for the two refills. She needs to get her sugar checked soon as possible. She is asking for a return response today. [phone].

## 2021-09-27 NOTE — TELEPHONE ENCOUNTER
Pt  Called to check the status of her refills from previous encounter. I informed the patient that the medications have been sent in to her pharmacy. Informed her that Dr. Shira Barrios is referring her to neurology.  She would like that processed and a call once completed [phone]

## 2021-10-04 ENCOUNTER — HOSPITAL ENCOUNTER (EMERGENCY)
Age: 56
Discharge: HOME | End: 2021-10-04
Attending: STUDENT IN AN ORGANIZED HEALTH CARE EDUCATION/TRAINING PROGRAM
Payer: COMMERCIAL

## 2021-10-04 ENCOUNTER — APPOINTMENT (OUTPATIENT)
Dept: CT IMAGING | Age: 56
End: 2021-10-04
Attending: PHYSICIAN ASSISTANT
Payer: COMMERCIAL

## 2021-10-04 VITALS
DIASTOLIC BLOOD PRESSURE: 67 MMHG | HEART RATE: 59 BPM | SYSTOLIC BLOOD PRESSURE: 106 MMHG | TEMPERATURE: 98.6 F | RESPIRATION RATE: 16 BRPM | OXYGEN SATURATION: 100 %

## 2021-10-04 DIAGNOSIS — R56.9 SEIZURE (HCC): Primary | ICD-10-CM

## 2021-10-04 DIAGNOSIS — G43.809 OTHER MIGRAINE WITHOUT STATUS MIGRAINOSUS, NOT INTRACTABLE: ICD-10-CM

## 2021-10-04 DIAGNOSIS — E87.6 HYPOKALEMIA: ICD-10-CM

## 2021-10-04 LAB
ALBUMIN SERPL-MCNC: 2.3 G/DL (ref 3.4–5)
ALBUMIN/GLOB SERPL: 0.4 {RATIO} (ref 0.8–1.7)
ALP SERPL-CCNC: 211 U/L (ref 45–117)
ALT SERPL-CCNC: 18 U/L (ref 13–56)
ANION GAP SERPL CALC-SCNC: 10 MMOL/L (ref 3–18)
AST SERPL-CCNC: 16 U/L (ref 10–38)
BASOPHILS # BLD: 0 K/UL (ref 0–0.1)
BASOPHILS NFR BLD: 0 % (ref 0–2)
BILIRUB SERPL-MCNC: 0.4 MG/DL (ref 0.2–1)
BUN SERPL-MCNC: 10 MG/DL (ref 7–18)
BUN/CREAT SERPL: 13 (ref 12–20)
CALCIUM SERPL-MCNC: 9.1 MG/DL (ref 8.5–10.1)
CHLORIDE SERPL-SCNC: 106 MMOL/L (ref 100–111)
CO2 SERPL-SCNC: 26 MMOL/L (ref 21–32)
CREAT SERPL-MCNC: 0.75 MG/DL (ref 0.6–1.3)
DIFFERENTIAL METHOD BLD: ABNORMAL
EOSINOPHIL # BLD: 0.3 K/UL (ref 0–0.4)
EOSINOPHIL NFR BLD: 3 % (ref 0–5)
ERYTHROCYTE [DISTWIDTH] IN BLOOD BY AUTOMATED COUNT: 14.2 % (ref 11.6–14.5)
GLOBULIN SER CALC-MCNC: 5.8 G/DL (ref 2–4)
GLUCOSE SERPL-MCNC: 121 MG/DL (ref 74–99)
HCT VFR BLD AUTO: 31.8 % (ref 35–45)
HGB BLD-MCNC: 10.2 G/DL (ref 12–16)
LYMPHOCYTES # BLD: 1.1 K/UL (ref 0.9–3.6)
LYMPHOCYTES NFR BLD: 11 % (ref 21–52)
MCH RBC QN AUTO: 28.6 PG (ref 24–34)
MCHC RBC AUTO-ENTMCNC: 32.1 G/DL (ref 31–37)
MCV RBC AUTO: 89.1 FL (ref 78–100)
MONOCYTES # BLD: 0.7 K/UL (ref 0.05–1.2)
MONOCYTES NFR BLD: 7 % (ref 3–10)
NEUTS SEG # BLD: 8.1 K/UL (ref 1.8–8)
NEUTS SEG NFR BLD: 79 % (ref 40–73)
PLATELET # BLD AUTO: 571 K/UL (ref 135–420)
PMV BLD AUTO: 9.3 FL (ref 9.2–11.8)
POTASSIUM SERPL-SCNC: 3.4 MMOL/L (ref 3.5–5.5)
PROT SERPL-MCNC: 8.1 G/DL (ref 6.4–8.2)
RBC # BLD AUTO: 3.57 M/UL (ref 4.2–5.3)
SODIUM SERPL-SCNC: 142 MMOL/L (ref 136–145)
WBC # BLD AUTO: 10.2 K/UL (ref 4.6–13.2)

## 2021-10-04 PROCEDURE — 72125 CT NECK SPINE W/O DYE: CPT

## 2021-10-04 PROCEDURE — 74011250637 HC RX REV CODE- 250/637: Performed by: PHYSICIAN ASSISTANT

## 2021-10-04 PROCEDURE — 80053 COMPREHEN METABOLIC PANEL: CPT

## 2021-10-04 PROCEDURE — 74011250636 HC RX REV CODE- 250/636: Performed by: PHYSICIAN ASSISTANT

## 2021-10-04 PROCEDURE — 96374 THER/PROPH/DIAG INJ IV PUSH: CPT

## 2021-10-04 PROCEDURE — 99284 EMERGENCY DEPT VISIT MOD MDM: CPT

## 2021-10-04 PROCEDURE — 70450 CT HEAD/BRAIN W/O DYE: CPT

## 2021-10-04 PROCEDURE — 85025 COMPLETE CBC W/AUTO DIFF WBC: CPT

## 2021-10-04 RX ORDER — POTASSIUM CHLORIDE 750 MG/1
10 TABLET, FILM COATED, EXTENDED RELEASE ORAL DAILY
Qty: 7 TABLET | Refills: 0 | Status: SHIPPED | OUTPATIENT
Start: 2021-10-04 | End: 2021-10-11

## 2021-10-04 RX ORDER — BUTALBITAL, ACETAMINOPHEN AND CAFFEINE 300; 40; 50 MG/1; MG/1; MG/1
1 CAPSULE ORAL
Status: DISCONTINUED | OUTPATIENT
Start: 2021-10-04 | End: 2021-10-04 | Stop reason: HOSPADM

## 2021-10-04 RX ORDER — LEVETIRACETAM 10 MG/ML
1000 INJECTION INTRAVASCULAR
Status: COMPLETED | OUTPATIENT
Start: 2021-10-04 | End: 2021-10-04

## 2021-10-04 RX ADMIN — BUTALBITAL, ACETAMINOPHEN, AND CAFFEINE CAPSULES 1 CAPSULE: 50; 300; 40 CAPSULE ORAL at 11:06

## 2021-10-04 RX ADMIN — LEVETIRACETAM 1000 MG: 1000 INJECTION, SOLUTION INTRAVENOUS at 10:26

## 2021-10-04 NOTE — ED PROVIDER NOTES
EMERGENCY DEPARTMENT HISTORY AND PHYSICAL EXAM      Date: 10/4/2021  Patient Name: Wade Jones    History of Presenting Illness     Chief Complaint   Patient presents with    Seizure       History Provided By: Patient    HPI: Wade Jones, 49 y.o. female PMHx significant for cirrhosis, htn, migraines, seizures presents via ambulance to the ED. EMS witnessed a tonic clonic seizure while pt was sitting on edge of bed. Pt reports she had not yet taken her keppra this morning. Pt reports hitting head on night stand. Denies CP, SOB, dizziness. Pt reports she now has a headache likely from hitting her head. Hx migraines and pt requesting fioricet. Denies abd pain, vomiting, diarrhea, fever/chills. Pt states she was feeling well this morning prior to seizure. There are no other complaints, changes, or physical findings at this time. PCP: Gopi Castillo MD    No current facility-administered medications on file prior to encounter. Current Outpatient Medications on File Prior to Encounter   Medication Sig Dispense Refill    butalbital-acetaminophen-caffeine (FIORICET, ESGIC) -40 mg per tablet Take 1 Tablet by mouth two (2) times daily as needed for Headache. 60 Tablet 1    metoprolol succinate (TOPROL-XL) 100 mg tablet Take 1 Tablet by mouth daily. 30 Tablet 2    flash glucose sensor (FreeStyle Mj 14 Day Sensor) kit Use to check blood sugar daily. 2 Kit 5    flash glucose scanning reader (FreeStyle Mj 14 Day [place]) misc Use to check blood sugar daily 2 Each 5    baclofen (LIORESAL) 10 mg tablet TAKE 1 TABLET BY MOUTH TWO (2) TIMES DAILY AS NEEDED FOR MUSCLE SPASM(S). 60 Tablet 1    furosemide (LASIX) 40 mg tablet TAKE 1 TABLET BY MOUTH THREE TIMES A DAY 90 Tablet 1    dicyclomine (BENTYL) 20 mg tablet TAKE 1 TAB BY MOUTH EVERY SIX HOURS 50 Tablet 1    sucralfate (Carafate) 1 gram tablet Take 1 Tablet by mouth four (4) times daily.  50 Tablet 1    magnesium citrate solution Take 1/3 of bottle every 8 hours until finished or until you have a bowel movement. 1 Bottle 0    omeprazole (PRILOSEC) 20 mg capsule Take 1 Cap by mouth daily. 90 Cap 1    ondansetron (ZOFRAN ODT) 8 mg disintegrating tablet Take 1 Tab by mouth every eight (8) hours as needed for Nausea or Vomiting. 12 Tab 2    lactulose (CHRONULAC) 10 gram/15 mL solution TAKE 30 ML BY MOUTH THREE (3) TIMES DAILY. 900 mL 1    spironolactone (Aldactone) 100 mg tablet Take 1 Tab by mouth three (3) times daily. 270 Tab 1    cloNIDine HCL (CATAPRES) 0.2 mg tablet Take 1 Tab by mouth two (2) times a day. 180 Tab 1    [DISCONTINUED] potassium chloride (Klor-Con M20) 20 mEq tablet TAKE 1 TAB BY MOUTH ONCE A DAY. 90 Tab 1    levETIRAcetam (Keppra) 500 mg tablet Take 500 mg by mouth two (2) times a day.  gabapentin (NEURONTIN) 300 mg capsule Take 300 mg by mouth three (3) times daily.  rifaximin (XIFAXAN) 550 mg tablet Take 1 Tab by mouth two (2) times a day. 180 Tab 4    MULTIVITAMIN/IRON/FOLIC ACID (CENTRUM ULTRA WOMEN'S PO) Take  by mouth.  METOPROLOL TARTRATE (LOPRESSOR PO) Take 25 mg by mouth every eight (8) hours.  FENTANYL TD 12 mcg by TransDERmal route.  CYANOCOBALAMIN, VITAMIN B-12, (VITAMIN B-12 PO) Take  by mouth.  CALCIUM CARBONATE/VITAMIN D3 (CALCIUM + D PO) Take  by mouth.  PHENYTOIN SODIUM EXTENDED (DILANTIN PO) Take 400 mg by mouth. Past History     Past Medical History:  Past Medical History:   Diagnosis Date    Anasarca     With ascites. Hospitalized 11/10. Resolved with diuretics. 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 Usually controlled with Dilantin.     Small bowel obstruction ([address] 07/2010       Past Surgical History:  Past Surgical History:   Procedure Laterality Date    HX GASTRIC BYPASS  2002    HX HIP REPLACEMENT  1991    Right hip replacement    HX KNEE REPLACEMENT         Family History:  Family History   Problem Relation Age of Onset    No Known Problems Mother     Heart Disease Father     Heart Attack Father        Social History:  Social History     Tobacco Use    Smoking status: Former Smoker    Smokeless tobacco: Never Used   Substance Use Topics    Alcohol use: No    Drug use: Not on file       Allergies: Allergies   Allergen Reactions    Latex Hives    Codeine Hives    Keflex [Cephalexin] Hives         Review of Systems   Review of Systems   Constitutional: Negative for chills and fever. Respiratory: Negative for shortness of breath. Cardiovascular: Negative for chest pain. Gastrointestinal: Negative for abdominal pain, nausea and vomiting. Genitourinary: Negative for flank pain. Musculoskeletal: Negative for back pain and myalgias. Skin: Negative for color change, pallor, rash and wound. Neurological: Positive for seizures. Negative for dizziness, weakness and light-headedness. All other systems reviewed and are negative. Physical Exam   Physical Exam  Vitals and nursing note reviewed. Constitutional:       General: She is not in acute distress. Appearance: She is well-developed. Comments: Pt in NAD   HENT:      Head: Normocephalic and atraumatic. Eyes:      Conjunctiva/sclera: Conjunctivae normal.      Comments: PERRL  EOM intact   Neck:      Comments: No midline tenderness  Radial pulses strong and equal b/l  Strength 5/5 to upper extremities b/l  Cardiovascular:      Rate and Rhythm: Normal rate and regular rhythm. Heart sounds: Normal heart sounds. Pulmonary:      Effort: Pulmonary effort is normal. No respiratory distress. Breath sounds: Normal breath sounds. Abdominal:      General: Bowel sounds are normal. There is no distension. Palpations: Abdomen is soft.    Musculoskeletal:         General: Normal range of motion. Skin:     General: Skin is warm. Findings: No rash. Neurological:      Mental Status: She is alert and oriented to person, place, and time. Comments: A&Ox4  Speech clear  Gait stable  Facial muscles equal and intact  Strength 5/5 in all extremities  Sensation intact in all extremities  (-) pronator drift  No finger to nose dysmetria     Psychiatric:         Behavior: Behavior normal.         Diagnostic Study Results     Labs -     Recent Results (from the past 12 hour(s))   CBC WITH AUTOMATED DIFF    Collection Time: 10/04/21  9:24 AM   Result Value Ref Range    WBC 10.2 4.6 - 13.2 K/uL    RBC 3.57 (L) 4.20 - 5.30 M/uL    HGB 10.2 (L) 12.0 - 16.0 g/dL    HCT 31.8 (L) 35.0 - 45.0 %    MCV 89.1 78.0 - 100.0 FL    MCH 28.6 24.0 - 34.0 PG    MCHC 32.1 31.0 - 37.0 g/dL    RDW 14.2 11.6 - 14.5 %    PLATELET 395 (H) 220 - 420 K/uL    MPV 9.3 9.2 - 11.8 FL    NEUTROPHILS 79 (H) 40 - 73 %    LYMPHOCYTES 11 (L) 21 - 52 %    MONOCYTES 7 3 - 10 %    EOSINOPHILS 3 0 - 5 %    BASOPHILS 0 0 - 2 %    ABS. NEUTROPHILS 8.1 (H) 1.8 - 8.0 K/UL    ABS. LYMPHOCYTES 1.1 0.9 - 3.6 K/UL    ABS. MONOCYTES 0.7 0.05 - 1.2 K/UL    ABS. EOSINOPHILS 0.3 0.0 - 0.4 K/UL    ABS. BASOPHILS 0.0 0.0 - 0.1 K/UL    DF AUTOMATED     METABOLIC PANEL, COMPREHENSIVE    Collection Time: 10/04/21  9:24 AM   Result Value Ref Range    Sodium 142 136 - 145 mmol/L    Potassium 3.4 (L) 3.5 - 5.5 mmol/L    Chloride 106 100 - 111 mmol/L    CO2 26 21 - 32 mmol/L    Anion gap 10 3.0 - 18 mmol/L    Glucose 121 (H) 74 - 99 mg/dL    BUN 10 7.0 - 18 MG/DL    Creatinine 0.75 0.6 - 1.3 MG/DL    BUN/Creatinine ratio 13 12 - 20      GFR est AA >60 >60 ml/min/1.73m2    GFR est non-AA >60 >60 ml/min/1.73m2    Calcium 9.1 8.5 - 10.1 MG/DL    Bilirubin, total 0.4 0.2 - 1.0 MG/DL    ALT (SGPT) 18 13 - 56 U/L    AST (SGOT) 16 10 - 38 U/L    Alk.  phosphatase 211 (H) 45 - 117 U/L    Protein, total 8.1 6.4 - 8.2 g/dL    Albumin 2.3 (L) 3.4 - 5.0 g/dL Globulin 5.8 (H) 2.0 - 4.0 g/dL    A-G Ratio 0.4 (L) 0.8 - 1.7         Radiologic Studies -   CT HEAD WO CONT   Final Result      No acute intracranial findings. CT SPINE CERV WO CONT   Final Result      1. No CT signs of cervical spine trauma. 2. C5-C6 degenerative disc disease with mild retrolisthesis. CT Results  (Last 48 hours)               10/04/21 1129  CT HEAD WO CONT Final result    Impression:      No acute intracranial findings. Narrative:  CT OF THE HEAD WITHOUT CONTRAST. CLINICAL HISTORY: Witnessed seizure. Tonic-clonic movement. Hit head on   nightstand. TECHNIQUE: Helical scan obtained of the head were obtained from the skull vertex   through the base of the skull without intravenous contrast.    All CT scans are   performed using dose optimization techniques as appropriate to the performed   exam including the following: Automated exposure control, adjustment of mA   and/or kV according to patient size, and use of iterative reconstructive   technique. COMPARISON: None. FINDINGS:        The sulcal pattern and ventricular system are normal in size and configuration   for age. No intracranial hemorrhage. No mass effect. The visualized paranasal   sinuses are clear. The mastoid air cells are clear. The visualized bony   structures are unremarkable. 10/04/21 1129  CT SPINE CERV WO CONT Final result    Impression:      1. No CT signs of cervical spine trauma. 2. C5-C6 degenerative disc disease with mild retrolisthesis. Narrative:  CT SCAN CERVICAL SPINE WITHOUT CONTRAST       HISTORY: Trauma. Seizure. Tonic-clonic movement. Hit head on nightstand. COMPARISON: None. TECHNIQUE: Axial images through the cervical spine were obtained without   intravenous contrast. Coronal and sagittal reformatted images were also obtained   for better evaluation of regional anatomy and alignment.   All CT scans are   performed using dose optimization techniques as appropriate to the performed   exam including the following: Automated exposure control, adjustment of mA   and/or kV according to patient size, and use of iterative reconstructive   technique. FINDINGS:       Odontoid intact. Occipital condylar/C-1/C-2 relationships normal. Vertebral body   height preserved. Advanced degenerative disc space narrowing associated with   endplate sclerosis, endplate degenerative irregularities and osteophytes at   C5-C6. Mild, grade I retrolisthesis C5 on C6. Minimal narrowing of the neural   foramen of C5-C6. Spinal canal and neural foraminal are otherwise widely patent. No acute fracture. No prevertebral soft tissue swelling. CXR Results  (Last 48 hours)    None          Medical Decision Making   I am the first provider for this patient. I reviewed the vital signs, available nursing notes, past medical history, past surgical history, family history and social history. Vital Signs-Reviewed the patient's vital signs. Patient Vitals for the past 12 hrs:   Temp Pulse Resp BP SpO2   10/04/21 0920 98.6 °F (37 °C) (!) 59 16 106/67 100 %       Records Reviewed: Nursing Notes and Old Medical Records    Provider Notes (Medical Decision Making):   DDx: Seizure, Missed dose of medication, Dehydration, Electrolyte abnormality, Infection, Hyper vs hypoglycemia    48 yo F who presents with seizure this am after missed dose of medication. On exam no injury. Normal neuro exam. CT head and neck shows no bleed or fracture. All lab essentially unremarkable. Chronic history of elevated alk phos. Patient given loading dose of Keppra and discussed need for prompt urology follow-up. At time of discharge, pt non-toxic appearing in NAD. Pt stable for prompt outpatient follow-up with PCP 1 to 2 days. Patient given strict instructions to return if symptoms worsen. ED Course:   Initial assessment performed.  The patients presenting problems have been discussed, and they are in agreement with the care plan formulated and outlined with them. I have encouraged them to ask questions as they arise throughout their visit. 1245: Pt reports improvement of headache with fioricet. Pt requesting discharge home. Disposition:  1:01 PM  Discussed lab and imaging results with pt along with dx and treatment plan. Discussed importance of PCP follow up. All questions answered. Pt voiced they understood. Return if sx worsen. PLAN:  1. Discharge Medication List as of 10/4/2021  1:01 PM      START taking these medications    Details   potassium chloride SR (Klor-Con 10) 10 mEq tablet Take 1 Tablet by mouth daily for 7 days. , Normal, Disp-7 Tablet, R-0         CONTINUE these medications which have NOT CHANGED    Details   butalbital-acetaminophen-caffeine (FIORICET, ESGIC) -40 mg per tablet Take 1 Tablet by mouth two (2) times daily as needed for Headache., Normal, Disp-60 Tablet, R-1DX Code Needed  PATIENT IS REQUESTING A REFILL. metoprolol succinate (TOPROL-XL) 100 mg tablet Take 1 Tablet by mouth daily. , Normal, Disp-30 Tablet, R-2      flash glucose sensor (FreeStyle Mj 14 Day Sensor) kit Use to check blood sugar daily. , Normal, Disp-2 Kit, R-5      flash glucose scanning reader (FreeStyle Mj 14 Day [place]) misc Use to check blood sugar daily, Normal, Disp-2 Each, R-5      baclofen (LIORESAL) 10 mg tablet TAKE 1 TABLET BY MOUTH TWO (2) TIMES DAILY AS NEEDED FOR MUSCLE SPASM(S)., Normal, Disp-60 Tablet, R-1      furosemide (LASIX) 40 mg tablet TAKE 1 TABLET BY MOUTH THREE TIMES A DAY, Normal, Disp-90 Tablet, R-1      dicyclomine (BENTYL) 20 mg tablet TAKE 1 TAB BY MOUTH EVERY SIX HOURS, Normal, Disp-50 Tablet, R-1DX Code Needed  PATIENT IS REQUESTING A REFILL. sucralfate (Carafate) 1 gram tablet Take 1 Tablet by mouth four (4) times daily. , Normal, Disp-50 Tablet, R-1      magnesium citrate solution Take 1/3 of bottle every 8 hours until finished or until you have a bowel movement. , Print, Disp-1 Bottle, R-0      omeprazole (PRILOSEC) 20 mg capsule Take 1 Cap by mouth daily. , Normal, Disp-90 Cap, R-1      ondansetron (ZOFRAN ODT) 8 mg disintegrating tablet Take 1 Tab by mouth every eight (8) hours as needed for Nausea or Vomiting., Normal, Disp-12 Tab, R-2      lactulose (CHRONULAC) 10 gram/15 mL solution TAKE 30 ML BY MOUTH THREE (3) TIMES DAILY., Normal, Disp-900 mL, R-1      spironolactone (Aldactone) 100 mg tablet Take 1 Tab by mouth three (3) times daily. , Normal, Disp-270 Tab, R-1      cloNIDine HCL (CATAPRES) 0.2 mg tablet Take 1 Tab by mouth two (2) times a day., Normal, Disp-180 Tab, R-1      levETIRAcetam (Keppra) 500 mg tablet Take 500 mg by mouth two (2) times a day., Historical Med      gabapentin (NEURONTIN) 300 mg capsule Take 300 mg by mouth three (3) times daily. , Historical Med      rifaximin (XIFAXAN) 550 mg tablet Take 1 Tab by mouth two (2) times a day. Normal, 550 mg, Disp-180 Tab, R-4      MULTIVITAMIN/IRON/FOLIC ACID (CENTRUM ULTRA WOMEN'S PO) Take  by mouth. Historical Med      METOPROLOL TARTRATE (LOPRESSOR PO) Take 25 mg by mouth every eight (8) hours. , Historical Med      FENTANYL TD 12 mcg by TransDERmal route., Historical Med      CYANOCOBALAMIN, VITAMIN B-12, (VITAMIN B-12 PO) Take  by mouth. Historical Med      CALCIUM CARBONATE/VITAMIN D3 (CALCIUM + D PO) Take  by mouth. Historical Med      PHENYTOIN SODIUM EXTENDED (DILANTIN PO) Take 400 mg by mouth., Historical Med         STOP taking these medications       potassium chloride (Klor-Con M20) 20 mEq tablet Comments:   Reason for Stoppin.   Follow-up Information     Follow up With Specialties Details Why Contact Info    Melba Sandhu MD Family Medicine Schedule an appointment as soon as possible for a visit in 1 day  [address] [facility]  [address]  [address] [place] [phone]      [facility] EMERGENCY DEPT Emergency Medicine  As needed, If symptoms worsen [address] [facility] [address]  [phone]        Return to ED if worse     Diagnosis     Clinical Impression:   1. Seizure ([address]    2. Other migraine without status migrainosus, not intractable    3. Hypokalemia        Attestations:    JESSICA Hernandez    Please note that this dictation was completed with [employer], the computer voice recognition software. Quite often unanticipated grammatical, syntax, homophones, and other interpretive errors are inadvertently transcribed by the computer software. Please disregard these errors. Please excuse any errors that have escaped final proofreading. Thank you.

## 2021-10-04 NOTE — ED TRIAGE NOTES
Pt presents via EMS after having a witnessed seizure that lasted a few seconds while standing on the edge of the bed. EMS report some tonic clonic movement. Pt with hx of seizures, takes Keppra.

## 2021-10-06 DIAGNOSIS — K74.60 CIRRHOSIS (HCC): ICD-10-CM

## 2021-10-06 RX ORDER — FUROSEMIDE 40 MG/1
TABLET ORAL
Qty: 90 TABLET | Refills: 1 | Status: SHIPPED | OUTPATIENT
Start: 2021-10-06

## 2021-10-07 NOTE — TELEPHONE ENCOUNTER
Called pt again. To let her know neuro referral sent. Left message for patient at home number requesting return call.

## 2021-10-29 DIAGNOSIS — K74.60 CIRRHOSIS (HCC): ICD-10-CM

## 2021-10-29 RX ORDER — FUROSEMIDE 40 MG/1
TABLET ORAL
Qty: 90 TABLET | Refills: 1 | OUTPATIENT
Start: 2021-10-29

## 2021-11-01 DIAGNOSIS — K74.60 CIRRHOSIS (HCC): ICD-10-CM

## 2021-11-01 RX ORDER — SPIRONOLACTONE 100 MG/1
TABLET, FILM COATED ORAL
Qty: 270 TABLET | Refills: 1 | OUTPATIENT
Start: 2021-11-01

## 2021-11-17 ENCOUNTER — OFFICE VISIT (OUTPATIENT)
Dept: FAMILY MEDICINE CLINIC | Age: 56
End: 2021-11-17
Payer: COMMERCIAL

## 2021-11-17 ENCOUNTER — TELEPHONE (OUTPATIENT)
Dept: FAMILY MEDICINE CLINIC | Age: 56
End: 2021-11-17

## 2021-11-17 VITALS
WEIGHT: 190 LBS | DIASTOLIC BLOOD PRESSURE: 78 MMHG | TEMPERATURE: 98.4 F | RESPIRATION RATE: 10 BRPM | OXYGEN SATURATION: 100 % | HEART RATE: 79 BPM | BODY MASS INDEX: 28.89 KG/M2 | SYSTOLIC BLOOD PRESSURE: 138 MMHG

## 2021-11-17 DIAGNOSIS — J30.1 NON-SEASONAL ALLERGIC RHINITIS DUE TO POLLEN: ICD-10-CM

## 2021-11-17 DIAGNOSIS — K74.60 CIRRHOSIS (HCC): ICD-10-CM

## 2021-11-17 DIAGNOSIS — K21.9 GASTROESOPHAGEAL REFLUX DISEASE WITHOUT ESOPHAGITIS: ICD-10-CM

## 2021-11-17 DIAGNOSIS — R56.9 SEIZURE (HCC): ICD-10-CM

## 2021-11-17 DIAGNOSIS — I10 ESSENTIAL HYPERTENSION: Primary | ICD-10-CM

## 2021-11-17 DIAGNOSIS — R11.0 NAUSEA: ICD-10-CM

## 2021-11-17 DIAGNOSIS — R73.9 ELEVATED BLOOD SUGAR: ICD-10-CM

## 2021-11-17 PROCEDURE — 99212 OFFICE O/P EST SF 10 MIN: CPT | Performed by: FAMILY MEDICINE

## 2021-11-17 RX ORDER — PANTOPRAZOLE SODIUM 40 MG/1
40 TABLET, DELAYED RELEASE ORAL DAILY
Qty: 90 TABLET | Refills: 1 | Status: SHIPPED | OUTPATIENT
Start: 2021-11-17

## 2021-11-17 NOTE — PROGRESS NOTES
.  Chief Complaint   Patient presents with    Headache    Seizure    Insomnia     Pt in office for med refills. She states she needs to discuss with dr Alta Kay her Ambien and her baclofen. She needs 90 days and she hasn't been getting it. I told her she can always either call our office or let the pharmacy know and they can contact us about gettting a 90 day supply on medicine.

## 2021-11-17 NOTE — TELEPHONE ENCOUNTER
I spoke with patient prior to appointment today because Dr. Salvador Arriaza is listed as her PCP and she had a visit with him in July of this year that stated she was establishing care. They have refilled medication since then as well. I asked the patient about this and she said no she is seeing Dr. Shira Barrios as her PCP. I let her know that is fine but she can not have two she is seeing and both doing med refills. I let her know she needs to make their office aware of her plans to follow with us and cancel her [facility] appt so they don't continue to refill meds when they are received since she has a f/u scheduled with them. I let her know to tell her pharmacy as well so only 1 doctor is prescribing medication and treating her for her safety. She stated she understood and would do all of the above. She said she also sees neurology and has an appt with the Np this afternoon who she will be seeing for the second time today at 4:00, I have added this name to her care team.  I have made Dr. Shira Barrios and his nurse Thad aware of my conversation. I confirmed her appt for 10:15 today after she thought it was 10:30. She then later arrived to her appt at 10:28. Dr. Shira Barrios agreed to still see her.

## 2021-11-17 NOTE — PATIENT INSTRUCTIONS
High Blood Pressure: Care Instructions  Overview     It's normal for blood pressure to go up and down throughout the day. But if it stays up, you have high blood pressure. Another name for high blood pressure is hypertension. Despite what a lot of people think, high blood pressure usually doesn't cause headaches or make you feel dizzy or lightheaded. It usually has no symptoms. But it does increase your risk of stroke, heart attack, and other problems. You and your doctor will talk about your risks of these problems based on your blood pressure. Your doctor will give you a goal for your blood pressure. Your goal will be based on your health and your age. Lifestyle changes, such as eating healthy and being active, are always important to help lower blood pressure. You might also take medicine to reach your blood pressure goal.  Follow-up care is a key part of your treatment and safety. Be sure to make and go to all appointments, and call your doctor if you are having problems. It's also a good idea to know your test results and keep a list of the medicines you take. How can you care for yourself at home? Medical treatment  · If you stop taking your medicine, your blood pressure will go back up. You may take one or more types of medicine to lower your blood pressure. Be safe with medicines. Take your medicine exactly as prescribed. Call your doctor if you think you are having a problem with your medicine. · Talk to your doctor before you start taking aspirin every day. Aspirin can help certain people lower their risk of a heart attack or stroke. But taking aspirin isn't right for everyone, because it can cause serious bleeding. · See your doctor regularly. You may need to see the doctor more often at first or until your blood pressure comes down. · If you are taking blood pressure medicine, talk to your doctor before you take decongestants or anti-inflammatory medicine, such as ibuprofen.  Some of these medicines can raise blood pressure. · Learn how to check your blood pressure at home. Lifestyle changes  · Stay at a healthy weight. This is especially important if you put on weight around the waist. Losing even 10 pounds can help you lower your blood pressure. · If your doctor recommends it, get more exercise. Walking is a good choice. Bit by bit, increase the amount you walk every day. Try for at least 30 minutes on most days of the week. You also may want to swim, bike, or do other activities. · Avoid or limit alcohol. Talk to your doctor about whether you can drink any alcohol. · Try to limit how much sodium you eat to less than 2,300 milligrams (mg) a day. Your doctor may ask you to try to eat less than 1,500 mg a day. · Eat plenty of fruits (such as bananas and oranges), vegetables, legumes, whole grains, and low-fat dairy products. · Lower the amount of saturated fat in your diet. Saturated fat is found in animal products such as milk, cheese, and meat. Limiting these foods may help you lose weight and also lower your risk for heart disease. · Do not smoke. Smoking increases your risk for heart attack and stroke. If you need help quitting, talk to your doctor about stop-smoking programs and medicines. These can increase your chances of quitting for good. When should you call for help? Call 911  anytime you think you may need emergency care. This may mean having symptoms that suggest that your blood pressure is causing a serious heart or blood vessel problem. Your blood pressure may be over 180/120. For example, call 911 if:    · You have symptoms of a heart attack. These may include:  ? Chest pain or pressure, or a strange feeling in the chest.  ? Sweating. ? Shortness of breath. ? Nausea or vomiting. ? Pain, pressure, or a strange feeling in the back, neck, jaw, or upper belly or in one or both shoulders or arms. ? Lightheadedness or sudden weakness.   ? A fast or irregular heartbeat.     · You have symptoms of a stroke. These may include:  ? Sudden numbness, tingling, weakness, or loss of movement in your face, arm, or leg, especially on only one side of your body. ? Sudden vision changes. ? Sudden trouble speaking. ? Sudden confusion or trouble understanding simple statements. ? Sudden problems with walking or balance. ? A sudden, severe headache that is different from past headaches.     · You have severe back or belly pain. Do not wait until your blood pressure comes down on its own. Get help right away. Call your doctor now or seek immediate care if:    · Your blood pressure is much higher than normal (such as 180/120 or higher), but you don't have symptoms.     · You think high blood pressure is causing symptoms, such as:  ? Severe headache.  ? Blurry vision. Watch closely for changes in your health, and be sure to contact your doctor if:    · Your blood pressure measures higher than your doctor recommends at least 2 times. That means the top number is higher or the bottom number is higher, or both.     · You think you may be having side effects from your blood pressure medicine. Where can you learn more? Go to http://www.gray.com/  Enter [id number] in the search box to learn more about \"High Blood Pressure: Care Instructions. \"  Current as of: April 29, 2021               Content Version: 13.0  © 2006-2021 [employer]. Care instructions adapted under license by [employer] (which disclaims liability or warranty for this information). If you have questions about a medical condition or this instruction, always ask your healthcare professional. [place] [address] any warranty or liability for your use of this information.

## 2021-11-17 NOTE — TELEPHONE ENCOUNTER
Patient called stating that none of her prescriptions were sent over as discussed with Dr. Kee Coates this morning in office for a 90 day supply. After reviewing the chart, the only medication sent was the protonix requested by the patient following the visit. Omeprazole was discontinued (doesn't work per pt). Mrs. Ching Saleh also inquired about her Fioricet. After I checked her medications, she should still have enough to last until 11/26/21. Patient informed me she was out because sometimes she takes more than the prescribed dosage. Informed provider of this as well, he was unaware. He stated that he will discuss filling this medication after she see's the neurologist today at [address] [place] [address] about her recent seizures. He preferred to review the notes and is interested on what the specialist recommends and/or prescribes. The nurse stated she would get the 90 day supply on all her routine medications except for the gabapentin (this was per the provider). When I informed Mrs. Toscano of this information, she cut me off and stated the following: \"I do not like third parties. I do not want a nurse to dictate my medications or tell me what the doctor says. I had a long conversation with Dr. Kee Coates today & he told me he would send my medications in. He had the opportunity to tell me this while we were face to face & he didn't. \"     Message was given to nurse and provider. Placed Mrs. Ching Saleh on hold and transferred her to the provider. I went in with the provider and he advised her of the same exact message as we shared above. Patient expressed understanding and is calling tomorrow for her medications. She will have the neurologist fax over the office visit from today.

## 2021-11-17 NOTE — PROGRESS NOTES
Eve Stratton is a 49 y.o. female  presents for follow up of headache and seizures. 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 Hospitalized 11/10. Resolved with diuretics. 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 Usually controlled with Dilantin.  Small bowel obstruction ([address] 07/2010     Social History     Socioeconomic History    Marital status:    Tobacco Use    Smoking status: Former Smoker    Smokeless tobacco: Never Used   Substance and Sexual Activity    Alcohol use: No     Family History   Problem Relation Age of Onset    No Known Problems Mother     Heart Disease Father     Heart Attack Father         Review of Systems   Constitutional: Negative for chills, fever, malaise/fatigue and weight loss. Eyes: Negative for blurred vision. Respiratory: Negative for cough, shortness of breath and wheezing. Cardiovascular: Negative for chest pain. Gastrointestinal: Negative for nausea and vomiting. Musculoskeletal: Negative for myalgias. Skin: Negative for rash. Neurological: Negative for weakness. Psychiatric/Behavioral: Negative.         Vitals:    11/17/21 1049   BP: 138/78   Pulse: 79   Resp: 10   Temp: 98.4 °F (36.9 °C)   SpO2: 100%   Weight: 190 lb (86.2 kg)   PainSc:   0 - No pain       Physical Exam  Vitals and nursing note reviewed. Constitutional:       Appearance: Normal appearance. Neck:      Thyroid: No thyromegaly. Cardiovascular:      Rate and Rhythm: Normal rate and regular rhythm. Pulses: Normal pulses. Heart sounds: Normal heart sounds. Pulmonary:      Effort: Pulmonary effort is normal.      Breath sounds: Normal breath sounds. Musculoskeletal:         General: Normal range of motion. Cervical back: Normal range of motion and neck supple. Skin:     General: Skin is warm and dry. Neurological:      General: No focal deficit present. Mental Status: She is alert and oriented to person, place, and time. Psychiatric:         Mood and Affect: Mood normal.         Behavior: Behavior normal.         Thought Content: Thought content normal.         Judgment: Judgment normal.         Assessment/Plan      ICD-10-CM ICD-9-CM    1. Essential hypertension  I10 401.9 cloNIDine HCL (CATAPRES) 0.2 mg tablet      metoprolol succinate (TOPROL-XL) 100 mg tablet   2. 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 Controlled with Dilantin  R56.9 780.39 metoprolol succinate (TOPROL-XL) 100 mg tablet   3. Gastroesophageal reflux disease without esophagitis  K21.9 530.81 pantoprazole (PROTONIX) 40 mg tablet   4. Nausea  R11.0 787.02 ondansetron (ZOFRAN ODT) 8 mg disintegrating tablet   5. Elevated blood sugar  R73.9 790.29 metoprolol succinate (TOPROL-XL) 100 mg tablet   6. Cirrhosis (HCC)  K74.60 571.5 spironolactone (Aldactone) 100 mg tablet   7. Non-seasonal allergic rhinitis due to pollen  J30.1 477.0 cetirizine (ZYRTEC) 10 mg tablet     I have discussed the diagnosis with the patient and the intended plan of care as seen in the above orders. The patient has received an after-visit summary and questions were answered concerning future plans. I have discussed medication, side effects, and warnings with the patient in detail.  The patient verbalized understanding and is in agreement with the plan of care. The patient will contact the office with any additional concerns.     lab results and schedule of future lab studies reviewed with patient    Jessica Morgan MD

## 2021-11-18 ENCOUNTER — TELEPHONE (OUTPATIENT)
Dept: FAMILY MEDICINE CLINIC | Age: 56
End: 2021-11-18

## 2021-11-18 RX ORDER — CLONIDINE HYDROCHLORIDE 0.2 MG/1
0.2 TABLET ORAL 2 TIMES DAILY
Qty: 180 TABLET | Refills: 1 | Status: SHIPPED | OUTPATIENT
Start: 2021-11-18

## 2021-11-18 RX ORDER — SPIRONOLACTONE 100 MG/1
100 TABLET, FILM COATED ORAL 3 TIMES DAILY
Qty: 270 TABLET | Refills: 1 | Status: SHIPPED | OUTPATIENT
Start: 2021-11-18

## 2021-11-18 RX ORDER — ONDANSETRON 8 MG/1
8 TABLET, ORALLY DISINTEGRATING ORAL
Qty: 12 TABLET | Refills: 2 | Status: SHIPPED | OUTPATIENT
Start: 2021-11-18

## 2021-11-18 RX ORDER — METOPROLOL SUCCINATE 100 MG/1
100 TABLET, EXTENDED RELEASE ORAL DAILY
Qty: 90 TABLET | Refills: 1 | Status: SHIPPED | OUTPATIENT
Start: 2021-11-18

## 2021-11-18 RX ORDER — CETIRIZINE HCL 10 MG
10 TABLET ORAL DAILY
Qty: 90 TABLET | Refills: 1 | Status: SHIPPED | OUTPATIENT
Start: 2021-11-18

## 2021-11-18 NOTE — TELEPHONE ENCOUNTER
I was unable to reach a pharmacist at [facility]. I was on hold over 20 mins 3 separate times I called them. I finally left a VM on their script line asking them to cancel all 6 meds that were sent in for pt. Clonidine, Zofran, Toprol. Spironolactone, zyrtec, and Protonix. I repeated back information and left office contact info.

## 2021-11-18 NOTE — TELEPHONE ENCOUNTER
Patient called the office requesting that all medications prescribed by Dr. Dana Sheehan  On today 11/18/21 be canceled and as of today to please discharge her from the practice.

## 2021-11-18 NOTE — TELEPHONE ENCOUNTER
Patient called the office today to follow up on message about prescriptions and conversation from yesterday. I checked her chart and no meds had been sent as of yet. I asked her to review them with me since there had seemed to be a lot of confusion about what she needed. She initially just wanted to tell me all of them or all that Dr. Nita Oneal prescribes and she needs 90 days. Since there was so many I pushed her to go through them and we did find it she is requesting some Dr. Nita Oneal has not prescribed before. The list requested is below:  Fioricet, but not requesting a 90 day supply of this one  Baclofen  Neurontin (never prescribed by Dr. Nita Oneal before)   Clonidine   Carafate  Lactulose  Rifaxin (never prescribed by Dr. Nita Oneal)   Zyrtec  Zofran (not requesting a 90 day)  Metoprolol  Spriniolactone  Ambien  Keppra (she states she has enough for now)  I spoke with Dr. Nita Oneal personally. He sent in 90 days of the Metoprolol, Spironolactone, Clonidine, and a shorter supply of Zyrtec and Zofran. Carafate, Lactulose, and Rifaxin he wants her to call GI about who she told me she sees in [place]. Fioricet, Baclofen, Neurontin, Keppra, and Ambien he is referring to Neurology after reviewing their office note. He is unable to prescribe these medications for her. I relayed the message to Ms. Toscano and was ok with calling GI or her liver specialist for the 3 meds but became upset when I told her her could not prescribe the others. She stated \"he's got me going everywhere, yesterday was the my last appt, I am going back to my old PCP\". I let her know that was fine and I understand and I will reflect this in her chart. Dr. Nita Oneal was notified and discharge letter sent to reflect this.

## 2022-06-01 ENCOUNTER — APPOINTMENT (OUTPATIENT)
Dept: GENERAL RADIOLOGY | Age: 57
End: 2022-06-01
Attending: STUDENT IN AN ORGANIZED HEALTH CARE EDUCATION/TRAINING PROGRAM
Payer: COMMERCIAL

## 2022-06-01 ENCOUNTER — APPOINTMENT (OUTPATIENT)
Dept: CT IMAGING | Age: 57
End: 2022-06-01
Attending: STUDENT IN AN ORGANIZED HEALTH CARE EDUCATION/TRAINING PROGRAM
Payer: COMMERCIAL

## 2022-06-01 ENCOUNTER — HOSPITAL ENCOUNTER (EMERGENCY)
Age: 57
Discharge: HOME | End: 2022-06-01
Attending: STUDENT IN AN ORGANIZED HEALTH CARE EDUCATION/TRAINING PROGRAM
Payer: COMMERCIAL

## 2022-06-01 VITALS
TEMPERATURE: 97.7 F | HEART RATE: 67 BPM | SYSTOLIC BLOOD PRESSURE: 126 MMHG | DIASTOLIC BLOOD PRESSURE: 71 MMHG | RESPIRATION RATE: 36 BRPM | OXYGEN SATURATION: 100 %

## 2022-06-01 DIAGNOSIS — R56.9 SEIZURE-LIKE ACTIVITY (HCC): Primary | ICD-10-CM

## 2022-06-01 LAB
ALBUMIN SERPL-MCNC: 2.7 G/DL (ref 3.4–5)
ALBUMIN/GLOB SERPL: 1 {RATIO} (ref 0.8–1.7)
ALP SERPL-CCNC: 96 U/L (ref 45–117)
ALT SERPL-CCNC: 25 U/L (ref 13–56)
ANION GAP SERPL CALC-SCNC: 6 MMOL/L (ref 3–18)
AST SERPL-CCNC: 32 U/L (ref 10–38)
ATRIAL RATE: 59 BPM
BASOPHILS # BLD: 0 K/UL (ref 0–0.1)
BASOPHILS NFR BLD: 1 % (ref 0–2)
BILIRUB SERPL-MCNC: 0.2 MG/DL (ref 0.2–1)
BUN SERPL-MCNC: 12 MG/DL (ref 7–18)
BUN/CREAT SERPL: 22 (ref 12–20)
CALCIUM SERPL-MCNC: 7.7 MG/DL (ref 8.5–10.1)
CALCULATED P AXIS, ECG09: 60 DEGREES
CALCULATED R AXIS, ECG10: 42 DEGREES
CALCULATED T AXIS, ECG11: 73 DEGREES
CHLORIDE SERPL-SCNC: 115 MMOL/L (ref 100–111)
CO2 SERPL-SCNC: 22 MMOL/L (ref 21–32)
CREAT SERPL-MCNC: 0.54 MG/DL (ref 0.6–1.3)
DIAGNOSIS, 93000: NORMAL
DIFFERENTIAL METHOD BLD: ABNORMAL
EOSINOPHIL # BLD: 0.2 K/UL (ref 0–0.4)
EOSINOPHIL NFR BLD: 8 % (ref 0–5)
ERYTHROCYTE [DISTWIDTH] IN BLOOD BY AUTOMATED COUNT: 13.7 % (ref 11.6–14.5)
GLOBULIN SER CALC-MCNC: 2.8 G/DL (ref 2–4)
GLUCOSE SERPL-MCNC: 83 MG/DL (ref 74–99)
HCT VFR BLD AUTO: 34.1 % (ref 35–45)
HGB BLD-MCNC: 11.1 G/DL (ref 12–16)
IMM GRANULOCYTES # BLD AUTO: 0 K/UL (ref 0–0.04)
IMM GRANULOCYTES NFR BLD AUTO: 0 % (ref 0–0.5)
LYMPHOCYTES # BLD: 1 K/UL (ref 0.9–3.6)
LYMPHOCYTES NFR BLD: 38 % (ref 21–52)
MAGNESIUM SERPL-MCNC: 1.6 MG/DL (ref 1.6–2.6)
MCH RBC QN AUTO: 31.3 PG (ref 24–34)
MCHC RBC AUTO-ENTMCNC: 32.6 G/DL (ref 31–37)
MCV RBC AUTO: 96.1 FL (ref 78–100)
MONOCYTES # BLD: 0.3 K/UL (ref 0.05–1.2)
MONOCYTES NFR BLD: 12 % (ref 3–10)
NEUTS SEG # BLD: 1 K/UL (ref 1.8–8)
NEUTS SEG NFR BLD: 41 % (ref 40–73)
NRBC # BLD: 0 K/UL (ref 0–0.01)
NRBC BLD-RTO: 0 PER 100 WBC
P-R INTERVAL, ECG05: 194 MS
PHENYTOIN SERPL-MCNC: 1.9 UG/ML (ref 10–20)
PLATELET # BLD AUTO: 221 K/UL (ref 135–420)
PMV BLD AUTO: 10.5 FL (ref 9.2–11.8)
POTASSIUM SERPL-SCNC: 3.8 MMOL/L (ref 3.5–5.5)
PROT SERPL-MCNC: 5.5 G/DL (ref 6.4–8.2)
Q-T INTERVAL, ECG07: 398 MS
QRS DURATION, ECG06: 70 MS
QTC CALCULATION (BEZET), ECG08: 394 MS
RBC # BLD AUTO: 3.55 M/UL (ref 4.2–5.3)
SODIUM SERPL-SCNC: 143 MMOL/L (ref 136–145)
VENTRICULAR RATE, ECG03: 59 BPM
WBC # BLD AUTO: 2.5 K/UL (ref 4.6–13.2)

## 2022-06-01 PROCEDURE — 96375 TX/PRO/DX INJ NEW DRUG ADDON: CPT

## 2022-06-01 PROCEDURE — 93005 ELECTROCARDIOGRAM TRACING: CPT

## 2022-06-01 PROCEDURE — 83735 ASSAY OF MAGNESIUM: CPT

## 2022-06-01 PROCEDURE — 74011250636 HC RX REV CODE- 250/636

## 2022-06-01 PROCEDURE — 96374 THER/PROPH/DIAG INJ IV PUSH: CPT

## 2022-06-01 PROCEDURE — 74011000258 HC RX REV CODE- 258: Performed by: STUDENT IN AN ORGANIZED HEALTH CARE EDUCATION/TRAINING PROGRAM

## 2022-06-01 PROCEDURE — 74011250636 HC RX REV CODE- 250/636: Performed by: STUDENT IN AN ORGANIZED HEALTH CARE EDUCATION/TRAINING PROGRAM

## 2022-06-01 PROCEDURE — 80053 COMPREHEN METABOLIC PANEL: CPT

## 2022-06-01 PROCEDURE — 80177 DRUG SCRN QUAN LEVETIRACETAM: CPT

## 2022-06-01 PROCEDURE — 71045 X-RAY EXAM CHEST 1 VIEW: CPT

## 2022-06-01 PROCEDURE — 80185 ASSAY OF PHENYTOIN TOTAL: CPT

## 2022-06-01 PROCEDURE — 99285 EMERGENCY DEPT VISIT HI MDM: CPT

## 2022-06-01 PROCEDURE — 70450 CT HEAD/BRAIN W/O DYE: CPT

## 2022-06-01 PROCEDURE — 85025 COMPLETE CBC W/AUTO DIFF WBC: CPT

## 2022-06-01 PROCEDURE — 74011250637 HC RX REV CODE- 250/637: Performed by: STUDENT IN AN ORGANIZED HEALTH CARE EDUCATION/TRAINING PROGRAM

## 2022-06-01 RX ORDER — LORAZEPAM 2 MG/ML
1 INJECTION, SOLUTION INTRAMUSCULAR; INTRAVENOUS
Status: COMPLETED | OUTPATIENT
Start: 2022-06-01 | End: 2022-06-01

## 2022-06-01 RX ORDER — ACETAMINOPHEN 500 MG
1000 TABLET ORAL ONCE
Status: DISCONTINUED | OUTPATIENT
Start: 2022-06-01 | End: 2022-06-01 | Stop reason: HOSPADM

## 2022-06-01 RX ORDER — LEVETIRACETAM 10 MG/ML
1000 INJECTION INTRAVASCULAR ONCE
Status: COMPLETED | OUTPATIENT
Start: 2022-06-01 | End: 2022-06-01

## 2022-06-01 RX ORDER — BUTALBITAL, ACETAMINOPHEN AND CAFFEINE 300; 40; 50 MG/1; MG/1; MG/1
1 CAPSULE ORAL
Status: COMPLETED | OUTPATIENT
Start: 2022-06-01 | End: 2022-06-01

## 2022-06-01 RX ORDER — LORAZEPAM 2 MG/ML
INJECTION, SOLUTION INTRAMUSCULAR; INTRAVENOUS
Status: COMPLETED
Start: 2022-06-01 | End: 2022-06-01

## 2022-06-01 RX ADMIN — SODIUM CHLORIDE 1000 ML: 9 INJECTION, SOLUTION INTRAVENOUS at 14:02

## 2022-06-01 RX ADMIN — LEVETIRACETAM 1000 MG: 1000 INJECTION, SOLUTION INTRAVENOUS at 10:47

## 2022-06-01 RX ADMIN — SODIUM CHLORIDE 800 MG PE: 9 INJECTION, SOLUTION INTRAVENOUS at 14:03

## 2022-06-01 RX ADMIN — LORAZEPAM 1 MG: 2 INJECTION, SOLUTION INTRAMUSCULAR; INTRAVENOUS at 11:15

## 2022-06-01 RX ADMIN — BUTALBITA,ACETAMINOPHEN AND CAFFEINE 1 CAPSULE: 50; 300; 40 CAPSULE ORAL at 14:40

## 2022-06-01 NOTE — ED PROVIDER NOTES
Patient 79-year-old female with history of recurrent seizures, hypertension, and cirrhosis of the liver. Patient visited with primary complaint of recurrent seizures, patient reportedly had multiple seizures today 1 of which was witnessed by EMS who gave her 2.5 mg of Versed which they state immediately improved her mental state. Patient was alert and oriented x3 upon my initial evaluation and reports that she has been compliant with all of her medications as prescribed and reports no recent medication changes and denies any illicit substance or alcohol use. Past Medical History:   Diagnosis Date    Anasarca     With ascites. Hospitalized 11/10. Resolved with diuretics.  Cirrhosis of liver ([address]     Secondary to NAFLD    Headache, migraine     Hepatic encephalopathy ([address]     Controlled with lactulose and Xifaxan    Hypertension     Seizures ([address]     Secondary to migraine headaches. Usually controlled with Dilantin.     Small bowel obstruction ([address] 07/2010       Past Surgical History:   Procedure Laterality Date    HX GASTRIC BYPASS  2002    HX HIP REPLACEMENT  1991    Right hip replacement    HX KNEE REPLACEMENT           Family History:   Problem Relation Age of Onset    No Known Problems Mother     Heart Disease Father     Heart Attack Father        Social History     Socioeconomic History    Marital status:      Spouse name: Not on file    Number of children: Not on file    Years of education: Not on file    Highest education level: Not on file   Occupational History    Not on file   Tobacco Use    Smoking status: Former Smoker    Smokeless tobacco: Never Used   Substance and Sexual Activity    Alcohol use: No    Drug use: Not on file    Sexual activity: Not on file   Other Topics Concern    Not on file   Social History Narrative    Not on file     Social Determinants of Health     Financial Resource Strain:     Difficulty of Paying Living Expenses: Not on file Food Insecurity:     Worried About Running Out of Food in the Last Year: Not on file    Regino of Food in the Last Year: Not on file   Transportation Needs:     Lack of Transportation (Medical): Not on file    Lack of Transportation (Non-Medical): Not on file   Physical Activity:     Days of Exercise per Week: Not on file    Minutes of Exercise per Session: Not on file   Stress:     Feeling of Stress : Not on file   Social Connections:     Frequency of Communication with Friends and Family: Not on file    Frequency of Social Gatherings with Friends and Family: Not on file    Attends [employer] Services: Not on file    Active Member of [address] [place] [address] [facility] or Organizations: Not on file    Attends Club or Organization Meetings: Not on file    Marital Status: Not on file   Intimate Partner Violence:     Fear of Current or Ex-Partner: Not on file    Emotionally Abused: Not on file    Physically Abused: Not on file    Sexually Abused: Not on file   Housing Stability:     Unable to Pay for Housing in the Last Year: Not on file    Number of [place] in the Last Year: Not on file    Unstable Housing in the Last Year: Not on file         ALLERGIES: Latex, Codeine, and Keflex [cephalexin]    Review of Systems   Constitutional: Negative for chills and fever. HENT: Negative for rhinorrhea and sore throat. Eyes: Negative for discharge and redness. Respiratory: Negative for cough and shortness of breath. Cardiovascular: Negative for chest pain and leg swelling. Gastrointestinal: Negative for abdominal pain, diarrhea, nausea and vomiting. Genitourinary: Negative for difficulty urinating and dysuria. Musculoskeletal: Negative for back pain and neck pain. Skin: Negative for rash and wound. Neurological: Positive for seizures and headaches. Negative for syncope and light-headedness.        Vitals:    06/01/22 1115 06/01/22 1130 06/01/22 1145 06/01/22 1245   BP: (!) 140/76 (!) 151/107 127/68 126/71 Pulse: 73 84 68 67   Resp: 18 18 14 (!) 36   Temp:       SpO2: 100% 100% 100% 100%            Physical Exam  Constitutional:       General: She is not in acute distress. Appearance: She is not ill-appearing, toxic-appearing or diaphoretic. HENT:      Head: Normocephalic and atraumatic. Right Ear: External ear normal.      Left Ear: External ear normal.      Nose: No congestion or rhinorrhea. Mouth/Throat:      Mouth: Mucous membranes are moist.      Pharynx: No oropharyngeal exudate or posterior oropharyngeal erythema. Eyes:      General:         Right eye: No discharge. Left eye: No discharge. Pupils: Pupils are equal, round, and reactive to light. Neck:      Vascular: No carotid bruit. Cardiovascular:      Rate and Rhythm: Normal rate and regular rhythm. Heart sounds: No murmur heard. No friction rub. No gallop. Pulmonary:      Effort: Pulmonary effort is normal. No respiratory distress. Breath sounds: No stridor. No wheezing, rhonchi or rales. Abdominal:      General: Abdomen is flat. There is no distension. Tenderness: There is no right CVA tenderness, left CVA tenderness, guarding or rebound. Musculoskeletal:         General: No swelling, tenderness, deformity or signs of injury. Cervical back: No rigidity or tenderness. Right lower leg: No edema. Left lower leg: No edema. Lymphadenopathy:      Cervical: No cervical adenopathy. Skin:     General: Skin is warm. Capillary Refill: Capillary refill takes less than 2 seconds. Coloration: Skin is not jaundiced or pale. Findings: No bruising, erythema, lesion or rash. Neurological:      General: No focal deficit present. Mental Status: She is alert and oriented to person, place, and time. GCS: GCS eye subscore is 4. GCS verbal subscore is 5. GCS motor subscore is 6. Cranial Nerves: No cranial nerve deficit, dysarthria or facial asymmetry.       Sensory: No sensory deficit. Motor: No weakness, tremor, abnormal muscle tone or seizure activity. Psychiatric:         Mood and Affect: Mood normal.          [facility]  ED Course as of 06/01/22 1522   Wed Jun 01, 2022   1033 I was called to bedside for potential seizure-like activity, patient exhibited high-frequency shaking that was distractible and was able to follow commands specifically able to begin to take deep breaths at my command during the episode. Will defer benzodiazepines at this time as I believe the risks of respiratory depression outweigh any benefits from it was likely not an epileptic seizure. Still will provide patient with loading dose of her home medication and continue to monitor closely. [VG]   2954 Patient's been evaluated by neurology team, they concur patient's presentation is more consistent with nonepileptic seizures, this was discussed in detail with patient and family at bedside they will recommend close outpatient neurology follow-up and continuation of current medications. We will plan for discharge. [HE]   3137 Patient has finished infusion of fosphenytoin, patient has had no further shaking episodes since being evaluated by the neurologist.  Patient reports overall she is feeling much better expresses insight into her condition and understanding that these may not be true epileptic seizures. We will recommend patient continues taking antiepileptic medications as she does have any documented abnormal EEG in the past and will recommend close follow-up with both her PCP and her primary care doctor for appropriate mental health evaluation and for medication adjustments as needed.  [JK]      ED Course User Index  [JK] Saul Manuel MD       Procedures

## 2022-06-01 NOTE — ED NOTES
Patient noted to be having seizure activity for approx. 45 seconds. 02 sat dropped to 86% on room air. Patient placed on NC at 2L/min. Dr. Jessica Garza at bedside. No new orders received.

## 2022-06-01 NOTE — Clinical Note
[address] [place] [address] [facility] EMERGENCY DEPT  [id number] [address] [phone]    Work/School Note    Date: 6/1/2022    To Whom It May concern:    Helen Cunningham was seen and treated today in the emergency room by the following provider(s):  Attending Provider: Tanna Castillo MD.      Helen Cunningham is excused from work/school on 6/1/2022 through 6/3/2022. She is medically clear to return to work/school on 6/4/2022.          Sincerely,          Elmo Garrison MD

## 2022-06-01 NOTE — CONSULTS
Neurology Consult    Patient: Isabel Borges MRN: [id number]  SSN: [id number]    YOB: 1965  Age: 56 y.o. Sex: female      HPI:  Isabel Borges is a 56 y.o. female, with history of seizures on levetiracetam and phenytoin, migraines, fibromyalgia and chronic pain, who presented to the ED with reported breakthrough seizure. History per patient, , ED staff: At home earlier today when she was reported to have multiple events concerning for seizure. Patient recalls normal morning activity with her niece, being on her porch and then waking up with EMS around her, but then with loss of time until she arrived in the ED. EMS administered BZ en route. On arrival, fully oriented without deficits per RN documentation. 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 Second event not witnessed by same physician, however, again with shaking for which she was given ativan 1mg. WBC 2.5, Hb 11.1, plt 221. Na 143, K 3.8, BUN/Cr 12/0.54, ca 7.7, Mg 1.6. Phenytoin level 1.9. Levetiracetam level pending. CTH without contrast did not show acute process. No seizure risk factors. At baseline, reports increase frequency of events in the last month or so, occurring 1 every two weeks. Notably, this is occurring in setting of multiple home stressors (discord with daughter, care of mother). She denies having any coping strategies for stress -- no counselor/therapist.     Previously followed with Dr. Tony Harrell at [id number] [place] [address] for seizure management - last office visit June 2020. At that time, reported seizure started after gastric bypass surgery in 2002 -- described as GTCs for which she was initially on phenytoin 400mg QHS. Prior MRI brain without focal findings.  Prior EEGs reported with seizure activity and 'coma in 2010.' but no repeat EEGs since that tie Breakthrough seizures occurring with AED cessation. Plan to transition to levetiracetam, however, phenytoin ultimately continued by patient after she changed neurologists. Notably, migraines poorly controlled at baseline and with documentation of fioricet overuse; patient apparently did not follow up at that location due to provider refusal to refill medication. Home AEDs: levetiracetam 500mg BID, phenytoin 400mg QHS    Recent presentation 22 May 2022 with breakthrough seizure to [facility]; episode of staring off for 30 seconds brought on with heat exposure. Past Medical History:   Diagnosis Date    Anasarca     With ascites. Hospitalized 11/10. Resolved with diuretics. 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 Usually controlled with Dilantin.  Small bowel obstruction ([address] 07/2010     Past Surgical History:   Procedure Laterality Date    HX GASTRIC BYPASS  2002    HX HIP REPLACEMENT  1991    Right hip replacement    HX KNEE REPLACEMENT        Family History   Problem Relation Age of Onset    No Known Problems Mother     Heart Disease Father     Heart Attack Father      Social History     Tobacco Use    Smoking status: Former Smoker    Smokeless tobacco: Never Used   Substance Use Topics    Alcohol use: No      Current Facility-Administered Medications   Medication Dose Route Frequency Provider Last Rate Last Admin    fosphenytoin (CEREBYX) 800 mg PE in 0.9% sodium chloride 100 mL IVPB  800 mg PE IntraVENous NOW Chandrakant Mccormick MD         Current Outpatient Medications   Medication Sig Dispense Refill    cloNIDine HCL (CATAPRES) 0.2 mg tablet Take 1 Tablet by mouth two (2) times a day.  180 Tablet 1    ondansetron (ZOFRAN ODT) 8 mg disintegrating tablet Take 1 Tablet by mouth every eight (8) hours as needed for Nausea or Vomiting. 12 Tablet 2    metoprolol succinate (TOPROL-XL) 100 mg tablet Take 1 Tablet by mouth daily. 90 Tablet 1    spironolactone (Aldactone) 100 mg tablet Take 1 Tablet by mouth three (3) times daily. 270 Tablet 1    cetirizine (ZYRTEC) 10 mg tablet Take 1 Tablet by mouth daily. 90 Tablet 1    pantoprazole (PROTONIX) 40 mg tablet Take 1 Tablet by mouth daily. 90 Tablet 1    furosemide (LASIX) 40 mg tablet TAKE 1 TABLET BY MOUTH THREE TIMES A DAY 90 Tablet 1    butalbital-acetaminophen-caffeine (FIORICET, ESGIC) -40 mg per tablet Take 1 Tablet by mouth two (2) times daily as needed for Headache. 60 Tablet 1    flash glucose sensor (FreeStyle Mj 14 Day Sensor) kit Use to check blood sugar daily. 2 Kit 5    flash glucose scanning reader (FreeStyle Mj 14 Day [place]) misc Use to check blood sugar daily 2 Each 5    baclofen (LIORESAL) 10 mg tablet TAKE 1 TABLET BY MOUTH TWO (2) TIMES DAILY AS NEEDED FOR MUSCLE SPASM(S). 60 Tablet 1    dicyclomine (BENTYL) 20 mg tablet TAKE 1 TAB BY MOUTH EVERY SIX HOURS 50 Tablet 1    sucralfate (Carafate) 1 gram tablet Take 1 Tablet by mouth four (4) times daily. 50 Tablet 1    magnesium citrate solution Take 1/3 of bottle every 8 hours until finished or until you have a bowel movement. 1 Bottle 0    lactulose (CHRONULAC) 10 gram/15 mL solution TAKE 30 ML BY MOUTH THREE (3) TIMES DAILY. 900 mL 1    levETIRAcetam (Keppra) 500 mg tablet Take 500 mg by mouth two (2) times a day.  gabapentin (NEURONTIN) 300 mg capsule Take 300 mg by mouth three (3) times daily.  rifaximin (XIFAXAN) 550 mg tablet Take 1 Tab by mouth two (2) times a day. 180 Tab 4    MULTIVITAMIN/IRON/FOLIC ACID (CENTRUM ULTRA WOMEN'S PO) Take  by mouth.  FENTANYL TD 12 mcg by TransDERmal route.  CYANOCOBALAMIN, VITAMIN B-12, (VITAMIN B-12 PO) Take  by mouth.  CALCIUM CARBONATE/VITAMIN D3 (CALCIUM + D PO) Take  by mouth.       PHENYTOIN SODIUM EXTENDED (DILANTIN PO) Take 400 mg by mouth. Allergies   Allergen Reactions    Latex Hives    Codeine Hives    Keflex [Cephalexin] Hives       Review of Systems: As in HPI, otherwise negative    Objective:     Vitals:    06/01/22 1030 06/01/22 1045   BP: (!) 144/71 131/79   Pulse: (!) 102 61   Resp: (!) 32 15   Temp: 97.7 °F (36.5 °C)    SpO2: (!) 83% 100%        Physical Exam:  General: awake, alert, no acute distress  HEENT: MM, non-icteric  CV: regular rate and rhythm  Pulm: non-labored on room air  Ext: no edema  Psych: tearful during interview    Neuro:  MS: Awake, alert and interactive. Language fluent and appropriate. Attention intact. Oriented to person, place, and year  CN: PERRL. EOMI without nystagmus. VF full to finger counting. Facial sensation intact to LT. Facial muscles full and symmetric. Hearing intact to finger rub bilaterally. Uvula midline with symmetric palatal elevation. SCMs and shoulder shrug normal. Tongue protrudes midline. MOTOR: Normal bulk and tone. No pronator drift. Intermittent, distractible tremor in RUE, cessation with alternative hand activation. UE strength: 5/5 deltoids, biceps, triceps, and hand  bilaterally. LE strength: 5/5 iliopsoas, gluteals, hamstrings, quadriceps, tibialis anterior, gastrocnemius. Reflexes: 1+ biceps, brachioradialis, patella and achilles. No clonus. Flexor plantar response bilaterally. Sensory: Intact to LT grossly throughout   Coordination: No dysmetria on FNF. Gait: Not assessed     Event witnessed after called back to bedside by : Patient with eyes closed, shaking of right upper extremity, irregular rhythm - she opened eyes briskly to voice and then stated that she was having increased back pain. Coaching provided for deep breathing, she complied. Relevant Labs/studies:  XR CHEST PORT    Result Date: 6/1/2022  No radiographic evidence of acute cardiopulmonary process.  Dictated by: Therisa Brunner, MD, PGY-2 As the attending radiologist, I have assessed the study images and dictated or reviewed/edited the final report as needed. CTH without contrast 1 June 2022  IMPRESSION  1. No hemorrhage identified. No evidence of acute intracranial pathology. Assessment & Plan:   56 y.o. female, with history of seizures on levetiracetam and phenytoin, migraines, fibromyalgia and chronic pain, presenting with several events of suspected seizure, and recurrent spells witnessed in ED here by multiple providers -- notably, events since arrival with non-rhythmic, irregular shaking - focal and generalized, eyes closed, responsive to questions/commands and cessation with deep breathing which are most consistent with non-epileptic behavioral spells. Neurologic examination and CTH completed and otherwise reassuring. Chart review suggestive of true epilepsy in the past in early 2000s, however, without any recent testing for current spells - potential for overlap with NEBs, if true epilepsy is present. 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    -No additional testing from neurologist standpoint   -Levetiracetam level pending   -Agree with dosing of home AEDs and infusion completed -- fosphenytion 800mg IV x1 (phenytoin level low at 1.8)  -Treatment for back pain/headache as per ED staff  -She should continue home AEDs without change; strongly advised medication compliance pending further evaluation  -Lengthy discussion with patient/ regarding NEBs and need for [address] care, coping skills.   expressed understanding.  -Follow up with her primary neurologist for continued management; would benefit from spell capture for events/prolonged monitoring to clarify underlying diagnosis (no EEGs since 2010)  -Further, would avoid use of fioricet given barbituate and potential for overuse  -No driving given AOA/LOC reported with events   -Follow up with [facility] for management of chronic conditions.   -Additional disposition per ED staff.  Please call with questions, concerns       Signed By: Wendy Castro MD   Neurologist    June 1, 2022

## 2022-06-01 NOTE — ED NOTES
Patient discharged by Dr. Hemanth Lanza. No questions or concerns voiced. Patient transported home by .

## 2022-06-01 NOTE — DISCHARGE INSTRUCTIONS
As we discussed please continue taking your medications as prescribed and please reach out to both your primary care doctor as well as your neurologist as soon as possible to arrange a follow-up appointment and to discuss any further adjustments in your medications. If you develop any sudden change in your symptoms including recurrent episodes of seizures, sudden/severe pain, difficulty breathing, or any other sudden/severe change in your condition please return immediately to the emergency department for further evaluation and treatment.

## 2022-06-01 NOTE — ED NOTES
Received patient via EMS stretcher. Per EMS, patient had multiple seizures back to back while at home and EMS was called by patient's niece. EMS gave 2.5mg of Versed prior to arrival. Patient is alert and oriented x 4, able to follow commands. Stated that she is compliant with seizure medications and took her Keppra this morning. Dr. Kaet Naqvi at bedside to assess patient.

## 2022-06-03 LAB — LEVETIRACETAM SERPL-MCNC: 23.1 UG/ML (ref 10–40)

## 2022-06-11 DIAGNOSIS — R73.9 ELEVATED BLOOD SUGAR: ICD-10-CM

## 2022-06-11 DIAGNOSIS — R56.9 SEIZURE (HCC): ICD-10-CM

## 2022-06-11 DIAGNOSIS — I10 ESSENTIAL HYPERTENSION: ICD-10-CM

## 2022-06-11 RX ORDER — METOPROLOL SUCCINATE 100 MG/1
TABLET, EXTENDED RELEASE ORAL
Qty: 90 TABLET | Refills: 1 | OUTPATIENT
Start: 2022-06-11

## 2022-08-17 ENCOUNTER — APPOINTMENT (OUTPATIENT)
Dept: CT IMAGING | Age: 57
End: 2022-08-17
Attending: EMERGENCY MEDICINE
Payer: COMMERCIAL

## 2022-08-17 ENCOUNTER — HOSPITAL ENCOUNTER (EMERGENCY)
Age: 57
Discharge: HOME | End: 2022-08-17
Attending: EMERGENCY MEDICINE
Payer: COMMERCIAL

## 2022-08-17 VITALS
DIASTOLIC BLOOD PRESSURE: 78 MMHG | HEART RATE: 55 BPM | RESPIRATION RATE: 16 BRPM | TEMPERATURE: 98.7 F | WEIGHT: 191 LBS | BODY MASS INDEX: 29.04 KG/M2 | SYSTOLIC BLOOD PRESSURE: 141 MMHG | OXYGEN SATURATION: 100 %

## 2022-08-17 DIAGNOSIS — T67.5XXA HEAT EXHAUSTION, INITIAL ENCOUNTER: Primary | ICD-10-CM

## 2022-08-17 LAB
ALBUMIN SERPL-MCNC: 3.1 G/DL (ref 3.4–5)
ALBUMIN/GLOB SERPL: 0.8 {RATIO} (ref 0.8–1.7)
ALP SERPL-CCNC: 175 U/L (ref 45–117)
ALT SERPL-CCNC: 20 U/L (ref 13–56)
ANION GAP SERPL CALC-SCNC: 5 MMOL/L (ref 3–18)
AST SERPL-CCNC: 28 U/L (ref 10–38)
ATRIAL RATE: 54 BPM
BASOPHILS # BLD: 0 K/UL (ref 0–0.1)
BASOPHILS NFR BLD: 0 % (ref 0–2)
BILIRUB SERPL-MCNC: 0.3 MG/DL (ref 0.2–1)
BUN SERPL-MCNC: 9 MG/DL (ref 7–18)
BUN/CREAT SERPL: 14 (ref 12–20)
CALCIUM SERPL-MCNC: 9 MG/DL (ref 8.5–10.1)
CALCULATED P AXIS, ECG09: 60 DEGREES
CALCULATED R AXIS, ECG10: 44 DEGREES
CALCULATED T AXIS, ECG11: 52 DEGREES
CHLORIDE SERPL-SCNC: 110 MMOL/L (ref 100–111)
CO2 SERPL-SCNC: 27 MMOL/L (ref 21–32)
CREAT SERPL-MCNC: 0.63 MG/DL (ref 0.6–1.3)
DIAGNOSIS, 93000: NORMAL
DIFFERENTIAL METHOD BLD: ABNORMAL
EOSINOPHIL # BLD: 0.1 K/UL (ref 0–0.4)
EOSINOPHIL NFR BLD: 2 % (ref 0–5)
ERYTHROCYTE [DISTWIDTH] IN BLOOD BY AUTOMATED COUNT: 13.1 % (ref 11.6–14.5)
ETHANOL SERPL-MCNC: <3 MG/DL (ref 0–3)
GLOBULIN SER CALC-MCNC: 3.8 G/DL (ref 2–4)
GLUCOSE SERPL-MCNC: 89 MG/DL (ref 74–99)
HCT VFR BLD AUTO: 33.2 % (ref 35–45)
HGB BLD-MCNC: 11.2 G/DL (ref 12–16)
IMM GRANULOCYTES # BLD AUTO: 0 K/UL (ref 0–0.04)
IMM GRANULOCYTES NFR BLD AUTO: 0 % (ref 0–0.5)
LYMPHOCYTES # BLD: 1.4 K/UL (ref 0.9–3.6)
LYMPHOCYTES NFR BLD: 22 % (ref 21–52)
MCH RBC QN AUTO: 30.3 PG (ref 24–34)
MCHC RBC AUTO-ENTMCNC: 33.7 G/DL (ref 31–37)
MCV RBC AUTO: 89.7 FL (ref 78–100)
MONOCYTES # BLD: 0.5 K/UL (ref 0.05–1.2)
MONOCYTES NFR BLD: 8 % (ref 3–10)
NEUTS SEG # BLD: 4.5 K/UL (ref 1.8–8)
NEUTS SEG NFR BLD: 68 % (ref 40–73)
NRBC # BLD: 0 K/UL (ref 0–0.01)
NRBC BLD-RTO: 0 PER 100 WBC
P-R INTERVAL, ECG05: 204 MS
PLATELET # BLD AUTO: 450 K/UL (ref 135–420)
PMV BLD AUTO: 9 FL (ref 9.2–11.8)
POTASSIUM SERPL-SCNC: 3.5 MMOL/L (ref 3.5–5.5)
PROT SERPL-MCNC: 6.9 G/DL (ref 6.4–8.2)
Q-T INTERVAL, ECG07: 422 MS
QRS DURATION, ECG06: 80 MS
QTC CALCULATION (BEZET), ECG08: 400 MS
RBC # BLD AUTO: 3.7 M/UL (ref 4.2–5.3)
SODIUM SERPL-SCNC: 142 MMOL/L (ref 136–145)
TROPONIN-HIGH SENSITIVITY: 9 NG/L (ref 0–54)
VENTRICULAR RATE, ECG03: 54 BPM
WBC # BLD AUTO: 6.5 K/UL (ref 4.6–13.2)

## 2022-08-17 PROCEDURE — 93005 ELECTROCARDIOGRAM TRACING: CPT

## 2022-08-17 PROCEDURE — 80053 COMPREHEN METABOLIC PANEL: CPT

## 2022-08-17 PROCEDURE — 82077 ASSAY SPEC XCP UR&BREATH IA: CPT

## 2022-08-17 PROCEDURE — 96374 THER/PROPH/DIAG INJ IV PUSH: CPT

## 2022-08-17 PROCEDURE — 99284 EMERGENCY DEPT VISIT MOD MDM: CPT

## 2022-08-17 PROCEDURE — 70450 CT HEAD/BRAIN W/O DYE: CPT

## 2022-08-17 PROCEDURE — 74011250636 HC RX REV CODE- 250/636: Performed by: EMERGENCY MEDICINE

## 2022-08-17 PROCEDURE — 84484 ASSAY OF TROPONIN QUANT: CPT

## 2022-08-17 PROCEDURE — 85025 COMPLETE CBC W/AUTO DIFF WBC: CPT

## 2022-08-17 RX ORDER — KETOROLAC TROMETHAMINE 15 MG/ML
15 INJECTION, SOLUTION INTRAMUSCULAR; INTRAVENOUS
Status: COMPLETED | OUTPATIENT
Start: 2022-08-17 | End: 2022-08-17

## 2022-08-17 RX ORDER — KETOROLAC TROMETHAMINE 15 MG/ML
INJECTION, SOLUTION INTRAMUSCULAR; INTRAVENOUS
Status: DISCONTINUED
Start: 2022-08-17 | End: 2022-08-17 | Stop reason: HOSPADM

## 2022-08-17 RX ADMIN — KETOROLAC TROMETHAMINE 15 MG: 15 INJECTION, SOLUTION INTRAMUSCULAR; INTRAVENOUS at 13:57

## 2022-08-17 NOTE — ED TRIAGE NOTES
Per medic:  patient was at home and had a syncopal episode.  Patient complain of severe headache and dizziness

## 2022-08-17 NOTE — ED PROVIDER NOTES
EMERGENCY DEPARTMENT HISTORY AND PHYSICAL EXAM    11:35 AM patient seen at this time in [facility] bed C      Date: 8/17/2022  Patient Name: Eve Stratton    History of Presenting Illness     Chief Complaint   Patient presents with    Syncope    Headache         History Provided By: patient    Additional History (Context): Eve Stratton is a 56 y.o. female presents with had a syncopal episode preceded by feeling very hot and lightheaded. Does have seizure disorder but assured us this was not a seizure. She is compliant with her Keppra. She has a bad headache. She did not fall no traumatic injury no recent illness. PCP: Alycia Walker MD    Chief Complaint:   Duration:    Timing:    Location:   Quality:   Severity:   Modifying Factors:   Associated Symptoms:       Current Facility-Administered Medications   Medication Dose Route Frequency Provider Last Rate Last Admin    ketorolac (TORADOL) 15 mg/mL injection              Current Outpatient Medications   Medication Sig Dispense Refill    cloNIDine HCL (CATAPRES) 0.2 mg tablet Take 1 Tablet by mouth two (2) times a day. 180 Tablet 1    ondansetron (ZOFRAN ODT) 8 mg disintegrating tablet Take 1 Tablet by mouth every eight (8) hours as needed for Nausea or Vomiting. 12 Tablet 2    metoprolol succinate (TOPROL-XL) 100 mg tablet Take 1 Tablet by mouth daily. 90 Tablet 1    spironolactone (Aldactone) 100 mg tablet Take 1 Tablet by mouth three (3) times daily. 270 Tablet 1    cetirizine (ZYRTEC) 10 mg tablet Take 1 Tablet by mouth daily. 90 Tablet 1    pantoprazole (PROTONIX) 40 mg tablet Take 1 Tablet by mouth daily. 90 Tablet 1    furosemide (LASIX) 40 mg tablet TAKE 1 TABLET BY MOUTH THREE TIMES A DAY 90 Tablet 1    butalbital-acetaminophen-caffeine (FIORICET, ESGIC) -40 mg per tablet Take 1 Tablet by mouth two (2) times daily as needed for Headache.  60 Tablet 1    flash glucose sensor (FreeStyle Mj 14 Day Sensor) kit Use to check blood sugar daily. 2 Kit 5    flash glucose scanning reader (NeofonieStyle Mj 14 Day [place]) [facility] Use to check blood sugar daily 2 Each 5    baclofen (LIORESAL) 10 mg tablet TAKE 1 TABLET BY MOUTH TWO (2) TIMES DAILY AS NEEDED FOR MUSCLE SPASM(S). 60 Tablet 1    dicyclomine (BENTYL) 20 mg tablet TAKE 1 TAB BY MOUTH EVERY SIX HOURS 50 Tablet 1    sucralfate (Carafate) 1 gram tablet Take 1 Tablet by mouth four (4) times daily. 50 Tablet 1    magnesium citrate solution Take 1/3 of bottle every 8 hours until finished or until you have a bowel movement. 1 Bottle 0    lactulose (CHRONULAC) 10 gram/15 mL solution TAKE 30 ML BY MOUTH THREE (3) TIMES DAILY. 900 mL 1    levETIRAcetam (Keppra) 500 mg tablet Take 500 mg by mouth two (2) times a day.      gabapentin (NEURONTIN) 300 mg capsule Take 300 mg by mouth three (3) times daily. rifaximin (XIFAXAN) 550 mg tablet Take 1 Tab by mouth two (2) times a day. 180 Tab 4    MULTIVITAMIN/IRON/FOLIC ACID (CENTRUM ULTRA WOMEN'S PO) Take  by mouth. FENTANYL TD 12 mcg by TransDERmal route. CYANOCOBALAMIN, VITAMIN B-12, (VITAMIN B-12 PO) Take  by mouth. CALCIUM CARBONATE/VITAMIN D3 (CALCIUM + D PO) Take  by mouth. PHENYTOIN SODIUM EXTENDED (DILANTIN PO) Take 400 mg by mouth. Past History     Past Medical History:  Past Medical History:   Diagnosis Date    Anasarca     With ascites. Hospitalized 11/10. Resolved with diuretics. 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 Usually controlled with Dilantin.     Small bowel obstruction ([address] 07/2010       Past Surgical History:  Past Surgical History:   Procedure Laterality Date    HX GASTRIC BYPASS  2002    HX HIP REPLACEMENT  1991    Right hip replacement    HX KNEE REPLACEMENT         Family History:  Family History   Problem Relation Age of Onset    No Known Problems Mother     Heart Disease Father     Heart Attack Father        Social History:  Social History     Tobacco Use    Smoking status: Former    Smokeless tobacco: Never   Substance Use Topics    Alcohol use: No       Allergies: Allergies   Allergen Reactions    Latex Hives    Codeine Hives    Keflex [Cephalexin] Hives         Review of Systems     Review of Systems   Constitutional:  Negative for diaphoresis and fever. HENT:  Negative for congestion and sore throat. Eyes:  Negative for pain and itching. Respiratory:  Negative for cough and shortness of breath. Cardiovascular:  Negative for chest pain and palpitations. Gastrointestinal:  Negative for abdominal pain and diarrhea. Endocrine: Negative for polydipsia and polyuria. Genitourinary:  Negative for dysuria and hematuria. Musculoskeletal:  Negative for arthralgias and myalgias. Skin:  Negative for rash and wound. Neurological:  Positive for syncope. Negative for seizures. Hematological:  Does not bruise/bleed easily. Psychiatric/Behavioral:  Negative for agitation and hallucinations. Physical Exam       Patient Vitals for the past 12 hrs:   Temp Pulse Resp BP SpO2   08/17/22 1148 98.7 °F (37.1 °C) (!) 55 16 (!) 141/78 100 %       IPVITALS  Patient Vitals for the past 24 hrs:   BP Temp Pulse Resp SpO2 Weight   08/17/22 1148 (!) 141/78 98.7 °F (37.1 °C) (!) 55 16 100 % 86.6 kg (191 lb)       Physical Exam  Vitals and nursing note reviewed. Constitutional:       Appearance: She is well-developed. HENT:      Head: Normocephalic and atraumatic. Eyes:      General: No scleral icterus. Extraocular Movements: Extraocular movements intact. Conjunctiva/sclera: Conjunctivae normal.      Pupils: Pupils are equal, round, and reactive to light. Neck:      Vascular: No JVD. Cardiovascular:      Rate and Rhythm: Normal rate and regular rhythm. Heart sounds: Normal heart sounds.       Comments: 4 intact extremity pulses  Pulmonary:      Effort: Pulmonary effort is normal.      Breath sounds: Normal breath sounds. Abdominal:      Palpations: Abdomen is soft. There is no mass. Tenderness: There is no abdominal tenderness. Musculoskeletal:         General: Normal range of motion. Cervical back: Normal range of motion and neck supple. Lymphadenopathy:      Cervical: No cervical adenopathy. Skin:     General: Skin is warm and dry. Neurological:      General: No focal deficit present. Mental Status: She is alert. Cranial Nerves: No cranial nerve deficit. Comments: Affect is a bit bizarre question alcohol intoxication         Diagnostic Study Results   Labs -  Recent Results (from the past 24 hour(s))   EKG, 12 LEAD, INITIAL    Collection Time: 08/17/22 12:02 PM   Result Value Ref Range    Ventricular Rate 54 BPM    Atrial Rate 54 BPM    P-R Interval 204 ms    QRS Duration 80 ms    Q-T Interval 422 ms    QTC Calculation (Bezet) 400 ms    Calculated P Axis 60 degrees    Calculated R Axis 44 degrees    Calculated T Axis 52 degrees    Diagnosis       Sinus bradycardia  Otherwise normal ECG  When compared with ECG of 01-JUN-2022 10:44,  No significant change was found     CBC WITH AUTOMATED DIFF    Collection Time: 08/17/22  1:14 PM   Result Value Ref Range    WBC 6.5 4.6 - 13.2 K/uL    RBC 3.70 (L) 4.20 - 5.30 M/uL    HGB 11.2 (L) 12.0 - 16.0 g/dL    HCT 33.2 (L) 35.0 - 45.0 %    MCV 89.7 78.0 - 100.0 FL    MCH 30.3 24.0 - 34.0 PG    MCHC 33.7 31.0 - 37.0 g/dL    RDW 13.1 11.6 - 14.5 %    PLATELET 967 (H) 476 - 420 K/uL    MPV 9.0 (L) 9.2 - 11.8 FL    NRBC 0.0 0  WBC    ABSOLUTE NRBC 0.00 0.00 - 0.01 K/uL    NEUTROPHILS 68 40 - 73 %    LYMPHOCYTES 22 21 - 52 %    MONOCYTES 8 3 - 10 %    EOSINOPHILS 2 0 - 5 %    BASOPHILS 0 0 - 2 %    IMMATURE GRANULOCYTES 0 0.0 - 0.5 %    ABS. NEUTROPHILS 4.5 1.8 - 8.0 K/UL    ABS. LYMPHOCYTES 1.4 0.9 - 3.6 K/UL    ABS.  MONOCYTES 0.5 0.05 - 1.2 K/UL    ABS. EOSINOPHILS 0.1 0.0 - 0.4 K/UL    ABS. BASOPHILS 0.0 0.0 - 0.1 K/UL    ABS. IMM. GRANS. 0.0 0.00 - 0.04 K/UL    DF AUTOMATED     METABOLIC PANEL, COMPREHENSIVE    Collection Time: 08/17/22  1:14 PM   Result Value Ref Range    Sodium 142 136 - 145 mmol/L    Potassium 3.5 3.5 - 5.5 mmol/L    Chloride 110 100 - 111 mmol/L    CO2 27 21 - 32 mmol/L    Anion gap 5 3.0 - 18 mmol/L    Glucose 89 74 - 99 mg/dL    BUN 9 7.0 - 18 MG/DL    Creatinine 0.63 0.6 - 1.3 MG/DL    BUN/Creatinine ratio 14 12 - 20      GFR est AA >60 >60 ml/min/1.73m2    GFR est non-AA >60 >60 ml/min/1.73m2    Calcium 9.0 8.5 - 10.1 MG/DL    Bilirubin, total 0.3 0.2 - 1.0 MG/DL    ALT (SGPT) 20 13 - 56 U/L    AST (SGOT) 28 10 - 38 U/L    Alk. phosphatase 175 (H) 45 - 117 U/L    Protein, total 6.9 6.4 - 8.2 g/dL    Albumin 3.1 (L) 3.4 - 5.0 g/dL    Globulin 3.8 2.0 - 4.0 g/dL    A-G Ratio 0.8 0.8 - 1.7     TROPONIN-HIGH SENSITIVITY    Collection Time: 08/17/22  1:14 PM   Result Value Ref Range    Troponin-High Sensitivity 9 0 - 54 ng/L   ETHYL ALCOHOL    Collection Time: 08/17/22  1:14 PM   Result Value Ref Range    ALCOHOL(ETHYL),SERUM <3 0 - 3 MG/DL       Radiologic Studies -   CT HEAD WO CONT   Final Result      No acute intracranial abnormality. Note: If clinical concern of acute stroke, CTA or MRI with diffusion weighted   images is recommended for better evaluation. Thank you for your referral.         CT HEAD WO CONT    Result Date: 8/17/2022  CT of the head without contrast HISTORY: Syncope and headache COMPARISON: 89/3/30 TECHNIQUE: Helical axial scan to the head was performed from the skull base to the vertex without IV contrast administration.  All CT scans at this facility performed using dose optimization techniques as appreciated to a performed exam, to include automated exposure control, adjustment of the mA and or KU according to patient size (including appropriate matching for site specific examination), or use of iterative reconstruction technique. FINDINGS: Brain volume appears unremarkable for age. No ventricular dilatation. The gray-white matter differentiation is preserved. There is no evidence of acute intracranial hemorrhage,  mass effect or midline shift identified. No skull fracture or extra axial fluid collections identified. Visualized sinuses shows small air-fluid level in left maxillary sinus. The mastoid air cells appear unremarkable. No acute intracranial abnormality. Note: If clinical concern of acute stroke, CTA or MRI with diffusion weighted images is recommended for better evaluation. Thank you for your referral.      Medications ordered:   Medications   ketorolac (TORADOL) 15 mg/mL injection (has no administration in time range)   ketorolac (TORADOL) injection 15 mg (15 mg IntraVENous Given 8/17/22 5847)         Medical Decision Making   Initial Medical Decision Making and DDx:  Twelve-lead EKG sinus rhythm at 54 narrow complex no acute process no predisposition to a lethal cardiac arrhythmia. Presyncopal symptoms with sensation of getting hot. Unlikely to be dangerous cardiac cause no seizure symptoms. Nonactionable labs no electrolyte abnormality anemia renal failure etc. and a negative head CT. Her pain is relieved and she is happy with plan for discharge at this point    ED Course: Progress Notes, Reevaluation, and Consults:         I am the first provider for this patient. I reviewed the vital signs, available nursing notes, past medical history, past surgical history, family history and social history. Patient Vitals for the past 12 hrs:   Temp Pulse Resp BP SpO2   08/17/22 1148 98.7 °F (37.1 °C) (!) 55 16 (!) 141/78 100 %       Vital Signs-Reviewed the patient's vital signs. Pulse Oximetry Analysis, Cardiac Monitor, 12 lead ekg:      Interpreted by the EP.       Records Reviewed: Nursing notes reviewed (Time of Review: 11:35 AM)    Procedures:   Critical Care Time:   Aspirin: (was aspirin given for stroke?)    Diagnosis     Clinical Impression:   1. Heat exhaustion, initial encounter        Disposition: Discharged      Follow-up Information       Follow up With Specialties Details Why Contact Info    Maryellen Fontaine, [facility]  [facility]   [phone]               Patient's Medications   Start Taking    No medications on file   Continue Taking    BACLOFEN (LIORESAL) 10 MG TABLET    TAKE 1 TABLET BY MOUTH TWO (2) TIMES DAILY AS NEEDED FOR MUSCLE SPASM(S). BUTALBITAL-ACETAMINOPHEN-CAFFEINE (FIORICET, ESGIC) -40 MG PER TABLET    Take 1 Tablet by mouth two (2) times daily as needed for Headache. CALCIUM CARBONATE/VITAMIN D3 (CALCIUM + D PO)    Take  by mouth. CETIRIZINE (ZYRTEC) 10 MG TABLET    Take 1 Tablet by mouth daily. CLONIDINE HCL (CATAPRES) 0.2 MG TABLET    Take 1 Tablet by mouth two (2) times a day. CYANOCOBALAMIN, VITAMIN B-12, (VITAMIN B-12 PO)    Take  by mouth. DICYCLOMINE (BENTYL) 20 MG TABLET    TAKE 1 TAB BY MOUTH EVERY SIX HOURS    FENTANYL TD    12 mcg by TransDERmal route. FLASH GLUCOSE SCANNING READER (FREESTYLE EVGENY 14 DAY READER) [facility]    Use to check blood sugar daily    FLASH GLUCOSE SENSOR (FREESTYLE EVGENY 14 DAY SENSOR) KIT    Use to check blood sugar daily. FUROSEMIDE (LASIX) 40 MG TABLET    TAKE 1 TABLET BY MOUTH THREE TIMES A DAY    GABAPENTIN (NEURONTIN) 300 MG CAPSULE    Take 300 mg by mouth three (3) times daily. LACTULOSE (CHRONULAC) 10 GRAM/15 ML SOLUTION    TAKE 30 ML BY MOUTH THREE (3) TIMES DAILY. LEVETIRACETAM (KEPPRA) 500 MG TABLET    Take 500 mg by mouth two (2) times a day. MAGNESIUM CITRATE SOLUTION    Take 1/3 of bottle every 8 hours until finished or until you have a bowel movement. METOPROLOL SUCCINATE (TOPROL-XL) 100 MG TABLET    Take 1 Tablet by mouth daily. MULTIVITAMIN/IRON/FOLIC ACID (CENTRUM ULTRA WOMEN'S PO)    Take  by mouth.     ONDANSETRON (ZOFRAN ODT) 8 MG DISINTEGRATING TABLET    Take 1 Tablet by mouth every eight (8) hours as needed for Nausea or Vomiting. PANTOPRAZOLE (PROTONIX) 40 MG TABLET    Take 1 Tablet by mouth daily. PHENYTOIN SODIUM EXTENDED (DILANTIN PO)    Take 400 mg by mouth. RIFAXIMIN (XIFAXAN) 550 MG TABLET    Take 1 Tab by mouth two (2) times a day. SPIRONOLACTONE (ALDACTONE) 100 MG TABLET    Take 1 Tablet by mouth three (3) times daily. SUCRALFATE (CARAFATE) 1 GRAM TABLET    Take 1 Tablet by mouth four (4) times daily.    These Medications have changed    No medications on file   Stop Taking    No medications on file     _______________________________    Notes:    Chandni Thornton MD using Dragon dictation      _______________________________

## 2022-08-17 NOTE — ED NOTES
Attempted iv x 1 without success. Transport in department to take to CT. Will attempt again after return. Patient requesting pain meds prior to CT. No orders at this time.    MD aware and awaiting orders

## 2022-08-17 NOTE — ED NOTES
Patient discharged with , steady gait at discharge. Steady gait.  Discharge instructions explained all questions answered

## 2022-11-14 DIAGNOSIS — J30.1 NON-SEASONAL ALLERGIC RHINITIS DUE TO POLLEN: ICD-10-CM

## 2022-11-14 RX ORDER — CETIRIZINE HYDROCHLORIDE 10 MG/1
TABLET ORAL
Qty: 90 TABLET | Refills: 1 | OUTPATIENT
Start: 2022-11-14

## 2023-01-16 DIAGNOSIS — R56.9 SEIZURE (HCC): ICD-10-CM

## 2023-01-16 DIAGNOSIS — R73.9 ELEVATED BLOOD SUGAR: ICD-10-CM

## 2023-01-16 DIAGNOSIS — I10 ESSENTIAL HYPERTENSION: ICD-10-CM

## 2023-01-16 RX ORDER — FLASH GLUCOSE SENSOR
KIT MISCELLANEOUS
Qty: 2 KIT | Refills: 5 | OUTPATIENT
Start: 2023-01-16

## 2023-05-10 ENCOUNTER — HOSPITAL ENCOUNTER (EMERGENCY)
Facility: HOSPITAL | Age: 58
Discharge: HOME | End: 2023-05-10
Attending: EMERGENCY MEDICINE
Payer: COMMERCIAL

## 2023-05-10 VITALS
TEMPERATURE: 98.4 F | DIASTOLIC BLOOD PRESSURE: 74 MMHG | SYSTOLIC BLOOD PRESSURE: 129 MMHG | OXYGEN SATURATION: 99 % | RESPIRATION RATE: 16 BRPM | HEART RATE: 78 BPM

## 2023-05-10 DIAGNOSIS — G40.909 SEIZURE DISORDER (HCC): Primary | ICD-10-CM

## 2023-05-10 LAB
ALBUMIN SERPL-MCNC: 3.3 G/DL (ref 3.4–5)
ALBUMIN/GLOB SERPL: 0.9 (ref 0.8–1.7)
ALP SERPL-CCNC: 170 U/L (ref 45–117)
ALT SERPL-CCNC: 82 U/L (ref 13–56)
ANION GAP SERPL CALC-SCNC: 2 MMOL/L (ref 3–18)
AST SERPL-CCNC: 210 U/L (ref 10–38)
BASOPHILS # BLD: 0 K/UL (ref 0–0.1)
BASOPHILS NFR BLD: 0 % (ref 0–2)
BILIRUB SERPL-MCNC: 0.2 MG/DL (ref 0.2–1)
BUN SERPL-MCNC: 20 MG/DL (ref 7–18)
BUN/CREAT SERPL: 27 (ref 12–20)
CALCIUM SERPL-MCNC: 8.8 MG/DL (ref 8.5–10.1)
CHLORIDE SERPL-SCNC: 108 MMOL/L (ref 100–111)
CO2 SERPL-SCNC: 29 MMOL/L (ref 21–32)
CREAT SERPL-MCNC: 0.74 MG/DL (ref 0.6–1.3)
DIFFERENTIAL METHOD BLD: ABNORMAL
EOSINOPHIL # BLD: 0.3 K/UL (ref 0–0.4)
EOSINOPHIL NFR BLD: 5 % (ref 0–5)
ERYTHROCYTE [DISTWIDTH] IN BLOOD BY AUTOMATED COUNT: 13.7 % (ref 11.6–14.5)
GLOBULIN SER CALC-MCNC: 3.7 G/DL (ref 2–4)
GLUCOSE SERPL-MCNC: 104 MG/DL (ref 74–99)
HCT VFR BLD AUTO: 34.4 % (ref 35–45)
HGB BLD-MCNC: 11 G/DL (ref 12–16)
IMM GRANULOCYTES # BLD AUTO: 0 K/UL (ref 0–0.04)
IMM GRANULOCYTES NFR BLD AUTO: 0 % (ref 0–0.5)
LYMPHOCYTES # BLD: 1.5 K/UL (ref 0.9–3.6)
LYMPHOCYTES NFR BLD: 31 % (ref 21–52)
MAGNESIUM SERPL-MCNC: 2 MG/DL (ref 1.6–2.6)
MCH RBC QN AUTO: 29.1 PG (ref 24–34)
MCHC RBC AUTO-ENTMCNC: 32 G/DL (ref 31–37)
MCV RBC AUTO: 91 FL (ref 78–100)
MONOCYTES # BLD: 0.2 K/UL (ref 0.05–1.2)
MONOCYTES NFR BLD: 5 % (ref 3–10)
NEUTS SEG # BLD: 2.8 K/UL (ref 1.8–8)
NEUTS SEG NFR BLD: 58 % (ref 40–73)
NRBC # BLD: 0 K/UL (ref 0–0.01)
NRBC BLD-RTO: 0 PER 100 WBC
PHENYTOIN SERPL-MCNC: 8 UG/ML (ref 10–20)
PLATELET # BLD AUTO: 274 K/UL (ref 135–420)
PMV BLD AUTO: 9.9 FL (ref 9.2–11.8)
POTASSIUM SERPL-SCNC: 4.8 MMOL/L (ref 3.5–5.5)
PROT SERPL-MCNC: 7 G/DL (ref 6.4–8.2)
RBC # BLD AUTO: 3.78 M/UL (ref 4.2–5.3)
SODIUM SERPL-SCNC: 139 MMOL/L (ref 136–145)
TROPONIN I SERPL HS-MCNC: 6 NG/L (ref 0–54)
TROPONIN I SERPL HS-MCNC: 6 NG/L (ref 0–54)
WBC # BLD AUTO: 4.7 K/UL (ref 4.6–13.2)

## 2023-05-10 PROCEDURE — 80185 ASSAY OF PHENYTOIN TOTAL: CPT

## 2023-05-10 PROCEDURE — 2580000003 HC RX 258: Performed by: EMERGENCY MEDICINE

## 2023-05-10 PROCEDURE — 96365 THER/PROPH/DIAG IV INF INIT: CPT

## 2023-05-10 PROCEDURE — 83735 ASSAY OF MAGNESIUM: CPT

## 2023-05-10 PROCEDURE — 99284 EMERGENCY DEPT VISIT MOD MDM: CPT

## 2023-05-10 PROCEDURE — 93005 ELECTROCARDIOGRAM TRACING: CPT | Performed by: EMERGENCY MEDICINE

## 2023-05-10 PROCEDURE — 6360000002 HC RX W HCPCS: Performed by: EMERGENCY MEDICINE

## 2023-05-10 PROCEDURE — 84484 ASSAY OF TROPONIN QUANT: CPT

## 2023-05-10 PROCEDURE — 80053 COMPREHEN METABOLIC PANEL: CPT

## 2023-05-10 PROCEDURE — 85025 COMPLETE CBC W/AUTO DIFF WBC: CPT

## 2023-05-10 RX ADMIN — SODIUM CHLORIDE 1000 MG PE: 9 INJECTION, SOLUTION INTRAVENOUS at 19:32

## 2023-05-10 ASSESSMENT — ENCOUNTER SYMPTOMS
SHORTNESS OF BREATH: 0
NAUSEA: 1
COUGH: 0
CHEST TIGHTNESS: 0

## 2023-05-10 NOTE — DISCHARGE INSTRUCTIONS
Given this episode of unresponsiveness whether it was a seizure or an episode of syncope, you should not be driving a motor vehicle for the next 6 months. Your Dilantin level was below the therapeutic level, it is important that you follow-up with your primary care doctor or your neurologist for possible medication adjustment.

## 2023-05-10 NOTE — ED TRIAGE NOTES
Patient had seizure today. Patient is known to have seizures in the past. She takes Dilantin and Ronold Gins for the seizures.  She was also started on Buprenorphine patch on Monday for a chronic pain in her hip and knees

## 2023-05-10 NOTE — ED PROVIDER NOTES
Consults:    Provider Notes (Medical Decision Making):       MDM  Number of Diagnoses or Management Options  Seizure disorder [facility])  Diagnosis management comments: 65-year-old female presenting with syncopal episode versus seizure. She has a history of seizures, will check Dilantin level, EKG, troponins. Is difficult to ascertain whether she had syncope versus a seizure however will assess her cardiac status mainly here as she is currently on her seizure medications. Laboratory studies demonstrate a subtherapeutic Dilantin level, will load with fosphenytoin. Awaiting repeat troponin, assuming this is normal, patient can be discharged home. Amount and/or Complexity of Data Reviewed  Clinical lab tests: ordered and reviewed  Tests in the medicine section of CPT®: ordered and reviewed    Risk of Complications, Morbidity, and/or Mortality  Presenting problems: moderate  Diagnostic procedures: moderate  Management options: moderate                 Procedures    7:29 PM : Pt care transferred to Dr. Leah Ramos  ,ED provider. History of patient complaint(s), available diagnostic reports and current treatment plan has been discussed thoroughly. Bedside rounding on patient occured : No  Intended disposition of patient : home    Pending diagnostics reports and/or labs (please list): troponin    Dr. Nicolette Joshua assistance in completion of this plan is greatly appreciated but it should be noted that I will be the provider of record for this patient. Diagnosis     Clinical Impression:   1. Seizure disorder [facility])        Disposition: home      Disclaimer: Sections of this note are dictated using utilizing voice recognition software. Minor typographical errors may be present. If questions arise, please do not hesitate to contact me or call our department.           Anabella Madrigal MD  05/10/23 1207

## 2023-05-11 LAB
EKG ATRIAL RATE: 57 BPM
EKG DIAGNOSIS: NORMAL
EKG P AXIS: 33 DEGREES
EKG P-R INTERVAL: 216 MS
EKG Q-T INTERVAL: 412 MS
EKG QRS DURATION: 68 MS
EKG QTC CALCULATION (BAZETT): 401 MS
EKG R AXIS: 19 DEGREES
EKG T AXIS: 60 DEGREES
EKG VENTRICULAR RATE: 57 BPM